# Patient Record
Sex: FEMALE | Race: WHITE | NOT HISPANIC OR LATINO
[De-identification: names, ages, dates, MRNs, and addresses within clinical notes are randomized per-mention and may not be internally consistent; named-entity substitution may affect disease eponyms.]

---

## 2020-08-06 ENCOUNTER — RESULT REVIEW (OUTPATIENT)
Age: 85
End: 2020-08-06

## 2020-09-09 ENCOUNTER — INPATIENT (INPATIENT)
Facility: HOSPITAL | Age: 85
LOS: 5 days | Discharge: HOME HEALTH | DRG: 919 | End: 2020-09-15
Admitting: HOSPITALIST
Payer: MEDICARE

## 2020-09-09 VITALS
DIASTOLIC BLOOD PRESSURE: 72 MMHG | OXYGEN SATURATION: 98 % | HEART RATE: 64 BPM | TEMPERATURE: 98 F | RESPIRATION RATE: 18 BRPM | HEIGHT: 64 IN | WEIGHT: 191.58 LBS | SYSTOLIC BLOOD PRESSURE: 130 MMHG

## 2020-09-09 DIAGNOSIS — K83.1 OBSTRUCTION OF BILE DUCT: ICD-10-CM

## 2020-09-09 DIAGNOSIS — Z90.710 ACQUIRED ABSENCE OF BOTH CERVIX AND UTERUS: Chronic | ICD-10-CM

## 2020-09-09 LAB
ABO RH CONFIRMATION: SIGNIFICANT CHANGE UP
ALBUMIN SERPL ELPH-MCNC: 2.6 G/DL — LOW (ref 3.3–5.2)
ALP SERPL-CCNC: 398 U/L — HIGH (ref 40–120)
ALT FLD-CCNC: 110 U/L — HIGH
ANION GAP SERPL CALC-SCNC: 12 MMOL/L — SIGNIFICANT CHANGE UP (ref 5–17)
APPEARANCE UR: CLEAR — SIGNIFICANT CHANGE UP
APTT BLD: 161.6 SEC — CRITICAL HIGH (ref 27.5–35.5)
APTT BLD: 77.9 SEC — HIGH (ref 27.5–35.5)
AST SERPL-CCNC: 114 U/L — HIGH
BACTERIA # UR AUTO: NEGATIVE — SIGNIFICANT CHANGE UP
BASOPHILS # BLD AUTO: 0.04 K/UL — SIGNIFICANT CHANGE UP (ref 0–0.2)
BASOPHILS NFR BLD AUTO: 0.8 % — SIGNIFICANT CHANGE UP (ref 0–2)
BILIRUB DIRECT SERPL-MCNC: >10 MG/DL — HIGH (ref 0–0.3)
BILIRUB INDIRECT FLD-MCNC: <4.6 MG/DL — HIGH (ref 0.2–1)
BILIRUB SERPL-MCNC: 14.6 MG/DL — HIGH (ref 0.4–2)
BILIRUB SERPL-MCNC: 14.6 MG/DL — HIGH (ref 0.4–2)
BILIRUB UR-MCNC: ABNORMAL
BUN SERPL-MCNC: 6 MG/DL — LOW (ref 8–20)
CALCIUM SERPL-MCNC: 8.6 MG/DL — SIGNIFICANT CHANGE UP (ref 8.6–10.2)
CHLORIDE SERPL-SCNC: 103 MMOL/L — SIGNIFICANT CHANGE UP (ref 98–107)
CO2 SERPL-SCNC: 22 MMOL/L — SIGNIFICANT CHANGE UP (ref 22–29)
COLOR SPEC: YELLOW — SIGNIFICANT CHANGE UP
CREAT SERPL-MCNC: <0.2 MG/DL — LOW (ref 0.5–1.3)
DIFF PNL FLD: ABNORMAL
EOSINOPHIL # BLD AUTO: 0.13 K/UL — SIGNIFICANT CHANGE UP (ref 0–0.5)
EOSINOPHIL NFR BLD AUTO: 2.6 % — SIGNIFICANT CHANGE UP (ref 0–6)
EPI CELLS # UR: SIGNIFICANT CHANGE UP
GLUCOSE BLDC GLUCOMTR-MCNC: 135 MG/DL — HIGH (ref 70–99)
GLUCOSE SERPL-MCNC: 128 MG/DL — HIGH (ref 70–99)
GLUCOSE UR QL: NEGATIVE MG/DL — SIGNIFICANT CHANGE UP
HCT VFR BLD CALC: 38.3 % — SIGNIFICANT CHANGE UP (ref 34.5–45)
HGB BLD-MCNC: 13.2 G/DL — SIGNIFICANT CHANGE UP (ref 11.5–15.5)
IMM GRANULOCYTES NFR BLD AUTO: 0.4 % — SIGNIFICANT CHANGE UP (ref 0–1.5)
INR BLD: 1.72 RATIO — HIGH (ref 0.88–1.16)
KETONES UR-MCNC: NEGATIVE — SIGNIFICANT CHANGE UP
LEUKOCYTE ESTERASE UR-ACNC: NEGATIVE — SIGNIFICANT CHANGE UP
LIDOCAIN IGE QN: 24 U/L — SIGNIFICANT CHANGE UP (ref 22–51)
LYMPHOCYTES # BLD AUTO: 1.07 K/UL — SIGNIFICANT CHANGE UP (ref 1–3.3)
LYMPHOCYTES # BLD AUTO: 21.1 % — SIGNIFICANT CHANGE UP (ref 13–44)
MCHC RBC-ENTMCNC: 32.8 PG — SIGNIFICANT CHANGE UP (ref 27–34)
MCHC RBC-ENTMCNC: 34.5 GM/DL — SIGNIFICANT CHANGE UP (ref 32–36)
MCV RBC AUTO: 95.3 FL — SIGNIFICANT CHANGE UP (ref 80–100)
MONOCYTES # BLD AUTO: 0.31 K/UL — SIGNIFICANT CHANGE UP (ref 0–0.9)
MONOCYTES NFR BLD AUTO: 6.1 % — SIGNIFICANT CHANGE UP (ref 2–14)
NEUTROPHILS # BLD AUTO: 3.49 K/UL — SIGNIFICANT CHANGE UP (ref 1.8–7.4)
NEUTROPHILS NFR BLD AUTO: 69 % — SIGNIFICANT CHANGE UP (ref 43–77)
NITRITE UR-MCNC: NEGATIVE — SIGNIFICANT CHANGE UP
PH UR: 6.5 — SIGNIFICANT CHANGE UP (ref 5–8)
PLATELET # BLD AUTO: 263 K/UL — SIGNIFICANT CHANGE UP (ref 150–400)
POTASSIUM SERPL-MCNC: 4.2 MMOL/L — SIGNIFICANT CHANGE UP (ref 3.5–5.3)
POTASSIUM SERPL-SCNC: 4.2 MMOL/L — SIGNIFICANT CHANGE UP (ref 3.5–5.3)
PROT SERPL-MCNC: 6.6 G/DL — SIGNIFICANT CHANGE UP (ref 6.6–8.7)
PROT UR-MCNC: 15 MG/DL
PROTHROM AB SERPL-ACNC: 19.4 SEC — HIGH (ref 10.6–13.6)
RBC # BLD: 4.02 M/UL — SIGNIFICANT CHANGE UP (ref 3.8–5.2)
RBC # FLD: 16.1 % — HIGH (ref 10.3–14.5)
RBC CASTS # UR COMP ASSIST: SIGNIFICANT CHANGE UP /HPF (ref 0–4)
SARS-COV-2 RNA SPEC QL NAA+PROBE: SIGNIFICANT CHANGE UP
SODIUM SERPL-SCNC: 137 MMOL/L — SIGNIFICANT CHANGE UP (ref 135–145)
SP GR SPEC: 1.01 — SIGNIFICANT CHANGE UP (ref 1.01–1.02)
UROBILINOGEN FLD QL: 1 MG/DL
WBC # BLD: 5.06 K/UL — SIGNIFICANT CHANGE UP (ref 3.8–10.5)
WBC # FLD AUTO: 5.06 K/UL — SIGNIFICANT CHANGE UP (ref 3.8–10.5)
WBC UR QL: SIGNIFICANT CHANGE UP

## 2020-09-09 PROCEDURE — 99222 1ST HOSP IP/OBS MODERATE 55: CPT

## 2020-09-09 PROCEDURE — 93010 ELECTROCARDIOGRAM REPORT: CPT

## 2020-09-09 PROCEDURE — 71045 X-RAY EXAM CHEST 1 VIEW: CPT | Mod: 26

## 2020-09-09 PROCEDURE — 99223 1ST HOSP IP/OBS HIGH 75: CPT | Mod: AI

## 2020-09-09 PROCEDURE — 99285 EMERGENCY DEPT VISIT HI MDM: CPT | Mod: CS

## 2020-09-09 PROCEDURE — 99223 1ST HOSP IP/OBS HIGH 75: CPT

## 2020-09-09 RX ORDER — PHYTONADIONE (VIT K1) 5 MG
10 TABLET ORAL ONCE
Refills: 0 | Status: DISCONTINUED | OUTPATIENT
Start: 2020-09-09 | End: 2020-09-09

## 2020-09-09 RX ORDER — INSULIN LISPRO 100/ML
VIAL (ML) SUBCUTANEOUS AT BEDTIME
Refills: 0 | Status: DISCONTINUED | OUTPATIENT
Start: 2020-09-09 | End: 2020-09-15

## 2020-09-09 RX ORDER — HEPARIN SODIUM 5000 [USP'U]/ML
INJECTION INTRAVENOUS; SUBCUTANEOUS
Qty: 25000 | Refills: 0 | Status: DISCONTINUED | OUTPATIENT
Start: 2020-09-09 | End: 2020-09-12

## 2020-09-09 RX ORDER — DEXTROSE 50 % IN WATER 50 %
12.5 SYRINGE (ML) INTRAVENOUS ONCE
Refills: 0 | Status: DISCONTINUED | OUTPATIENT
Start: 2020-09-09 | End: 2020-09-15

## 2020-09-09 RX ORDER — SODIUM CHLORIDE 9 MG/ML
1000 INJECTION INTRAMUSCULAR; INTRAVENOUS; SUBCUTANEOUS
Refills: 0 | Status: DISCONTINUED | OUTPATIENT
Start: 2020-09-09 | End: 2020-09-13

## 2020-09-09 RX ORDER — ATENOLOL 25 MG/1
50 TABLET ORAL
Refills: 0 | Status: DISCONTINUED | OUTPATIENT
Start: 2020-09-09 | End: 2020-09-15

## 2020-09-09 RX ORDER — INSULIN LISPRO 100/ML
VIAL (ML) SUBCUTANEOUS
Refills: 0 | Status: DISCONTINUED | OUTPATIENT
Start: 2020-09-09 | End: 2020-09-15

## 2020-09-09 RX ORDER — SPIRONOLACTONE 25 MG/1
50 TABLET, FILM COATED ORAL
Refills: 0 | Status: DISCONTINUED | OUTPATIENT
Start: 2020-09-09 | End: 2020-09-15

## 2020-09-09 RX ORDER — PIPERACILLIN AND TAZOBACTAM 4; .5 G/20ML; G/20ML
3.38 INJECTION, POWDER, LYOPHILIZED, FOR SOLUTION INTRAVENOUS EVERY 8 HOURS
Refills: 0 | Status: DISCONTINUED | OUTPATIENT
Start: 2020-09-09 | End: 2020-09-15

## 2020-09-09 RX ORDER — GLUCAGON INJECTION, SOLUTION 0.5 MG/.1ML
1 INJECTION, SOLUTION SUBCUTANEOUS ONCE
Refills: 0 | Status: DISCONTINUED | OUTPATIENT
Start: 2020-09-09 | End: 2020-09-15

## 2020-09-09 RX ORDER — HEPARIN SODIUM 5000 [USP'U]/ML
3000 INJECTION INTRAVENOUS; SUBCUTANEOUS EVERY 6 HOURS
Refills: 0 | Status: DISCONTINUED | OUTPATIENT
Start: 2020-09-09 | End: 2020-09-12

## 2020-09-09 RX ORDER — DEXTROSE 50 % IN WATER 50 %
25 SYRINGE (ML) INTRAVENOUS ONCE
Refills: 0 | Status: DISCONTINUED | OUTPATIENT
Start: 2020-09-09 | End: 2020-09-15

## 2020-09-09 RX ORDER — HEPARIN SODIUM 5000 [USP'U]/ML
6500 INJECTION INTRAVENOUS; SUBCUTANEOUS ONCE
Refills: 0 | Status: COMPLETED | OUTPATIENT
Start: 2020-09-09 | End: 2020-09-09

## 2020-09-09 RX ORDER — PHYTONADIONE (VIT K1) 5 MG
2.5 TABLET ORAL ONCE
Refills: 0 | Status: COMPLETED | OUTPATIENT
Start: 2020-09-09 | End: 2020-09-09

## 2020-09-09 RX ORDER — PIPERACILLIN AND TAZOBACTAM 4; .5 G/20ML; G/20ML
3.38 INJECTION, POWDER, LYOPHILIZED, FOR SOLUTION INTRAVENOUS ONCE
Refills: 0 | Status: COMPLETED | OUTPATIENT
Start: 2020-09-09 | End: 2020-09-09

## 2020-09-09 RX ORDER — LATANOPROST 0.05 MG/ML
1 SOLUTION/ DROPS OPHTHALMIC; TOPICAL AT BEDTIME
Refills: 0 | Status: DISCONTINUED | OUTPATIENT
Start: 2020-09-09 | End: 2020-09-15

## 2020-09-09 RX ORDER — SODIUM CHLORIDE 9 MG/ML
1000 INJECTION, SOLUTION INTRAVENOUS
Refills: 0 | Status: DISCONTINUED | OUTPATIENT
Start: 2020-09-09 | End: 2020-09-11

## 2020-09-09 RX ORDER — DEXTROSE 50 % IN WATER 50 %
15 SYRINGE (ML) INTRAVENOUS ONCE
Refills: 0 | Status: DISCONTINUED | OUTPATIENT
Start: 2020-09-09 | End: 2020-09-15

## 2020-09-09 RX ORDER — LANOLIN ALCOHOL/MO/W.PET/CERES
5 CREAM (GRAM) TOPICAL AT BEDTIME
Refills: 0 | Status: DISCONTINUED | OUTPATIENT
Start: 2020-09-09 | End: 2020-09-15

## 2020-09-09 RX ORDER — HEPARIN SODIUM 5000 [USP'U]/ML
6500 INJECTION INTRAVENOUS; SUBCUTANEOUS EVERY 6 HOURS
Refills: 0 | Status: DISCONTINUED | OUTPATIENT
Start: 2020-09-09 | End: 2020-09-12

## 2020-09-09 RX ADMIN — PIPERACILLIN AND TAZOBACTAM 200 GRAM(S): 4; .5 INJECTION, POWDER, LYOPHILIZED, FOR SOLUTION INTRAVENOUS at 18:18

## 2020-09-09 RX ADMIN — Medication 2.5 MILLIGRAM(S): at 18:18

## 2020-09-09 RX ADMIN — HEPARIN SODIUM 1200 UNIT(S)/HR: 5000 INJECTION INTRAVENOUS; SUBCUTANEOUS at 20:04

## 2020-09-09 RX ADMIN — Medication 5 MILLIGRAM(S): at 22:01

## 2020-09-09 RX ADMIN — SPIRONOLACTONE 50 MILLIGRAM(S): 25 TABLET, FILM COATED ORAL at 18:47

## 2020-09-09 RX ADMIN — Medication 20 MILLIGRAM(S): at 22:00

## 2020-09-09 RX ADMIN — LATANOPROST 1 DROP(S): 0.05 SOLUTION/ DROPS OPHTHALMIC; TOPICAL at 22:00

## 2020-09-09 RX ADMIN — ATENOLOL 50 MILLIGRAM(S): 25 TABLET ORAL at 18:47

## 2020-09-09 RX ADMIN — SODIUM CHLORIDE 50 MILLILITER(S): 9 INJECTION INTRAMUSCULAR; INTRAVENOUS; SUBCUTANEOUS at 15:19

## 2020-09-09 RX ADMIN — HEPARIN SODIUM 1500 UNIT(S)/HR: 5000 INJECTION INTRAVENOUS; SUBCUTANEOUS at 12:37

## 2020-09-09 RX ADMIN — HEPARIN SODIUM 0 UNIT(S)/HR: 5000 INJECTION INTRAVENOUS; SUBCUTANEOUS at 19:07

## 2020-09-09 NOTE — ED ADULT NURSE REASSESSMENT NOTE - NS ED NURSE REASSESS COMMENT FT1
GI MD ordered vit K due to elevated INR. RN spoke with MD and nurse manager regarding giving due to pt on heparin gtt, Emergency Department pharmacist aware, she will speak to MD and clarify order for vit k.

## 2020-09-09 NOTE — CONSULT NOTE ADULT - SUBJECTIVE AND OBJECTIVE BOX
Patient is a 89y old  Female who presents with a chief complaint of jaundice with malaise, weakness and light headedness.    HPI: 89 year old female admitted to [place] on about 8/5 with jaundice. W/U with cat scan and MRI showed a dilated pancreatic duct and some minor CBD dilation with possible stricture in proximal pancreatic duct near the ampulla. On 8/6 she underwent an ERCP by Dr. Roth revealing a ? stricture of the CBD just proximal to the ampulla and a tortuous pancreatic duct. Brushings were performed and a 5cm X 8.5 F CBD stent was placed. Imaging did not show any pancreatic lesions although there were some small pancreatic stents present. She was discharged to rehab and was lost to F/U. She was readmitted to [place] 9/5 after staff at rehab noted jaundice. Patient continues with malaise, light headedness and weakness but no abdominal pain, nausea or vomiting. She tolerates PO intake. Patient now transferred here for EUS/ERCP. She denies any chest pain or SOB. There is no constipation although her BMs are less frequent as she has been eating less since being admitted to [facility].      REVIEW OF SYSTEMS:    CONSTITUTIONAL: no weight loss but otherwise as above  EYES: No eye pain, visual disturbances, or discharge  ENMT:  No difficulty hearing, tinnitus, vertigo; No sinus or throat pain  NECK: No pain or stiffness  RESPIRATORY: No cough, wheezing, chills or hemoptysis; No shortness of breath  CARDIOVASCULAR: No chest pain, palpitations, dizziness, or leg swelling  GASTROINTESTINAL: as above  NEUROLOGICAL: No headaches, memory loss, loss of strength, numbness, or tremors  SKIN: No itching, burning, rashes, or lesions   LYMPH NODES: No enlarged glands  MUSCULOSKELETAL: No joint pain or swelling; No muscle, back, or extremity pain  PSYCHIATRIC: No depression, anxiety, mood swings, or difficulty sleeping  HEME/LYMPH: No easy bruising, or bleeding gums  ALLERY AND IMMUNOLOGIC: No hives or eczema      PAST MEDICAL & SURGICAL HISTORY:  Hypertension  Afib      FAMILY HISTORY:      SOCIAL HISTORY:  Smoking Status: [ ] Current, [ ] Former, [x ] Never  Pack Years:  Alcohol Use: none    Home Medications:      MEDICATIONS:  MEDICATIONS  (STANDING):  heparin  Infusion.  Unit(s)/Hr (15 mL/Hr) IV Continuous <Continuous>    MEDICATIONS  (PRN):  heparin   Injectable 6500 Unit(s) IV Push every 6 hours PRN For aPTT less than 40  heparin   Injectable 3000 Unit(s) IV Push every 6 hours PRN For aPTT between 40 - 57      Allergies    No Known Allergies    Intolerances        Vital Signs Last 24 Hrs  T(C): 36.6 (09 Sep 2020 11:22), Max: 36.6 (09 Sep 2020 11:22)  T(F): 97.8 (09 Sep 2020 11:22), Max: 97.8 (09 Sep 2020 11:22)  HR: 72 (09 Sep 2020 12:01) (64 - 72)  BP: 166/86 (09 Sep 2020 12:01) (130/72 - 166/86)  BP(mean): --  RR: 18 (09 Sep 2020 12:01) (18 - 18)  SpO2: 98% (09 Sep 2020 12:01) (98% - 98%)        PHYSICAL EXAM:    General: Well developed; well nourished; in no acute distress, jaundiced  HEENT: MMM, conjunctiva and scleral icterus  Lungs: Clear  Heart: Rhythm irregularly irregular, No Murmurs  Gastrointestinal: Soft, non-tender non-distended; Normal bowel sounds; No rebound or guarding; No Organomegaly & No Masses, liver felt 2FB below right costal margin  Extremities: Normal range of motion, 1+ pitting edema with ecchymosis over distal legs and ankles  Neurological: Alert and oriented x3, Non-focal  Skin: Warm and dry. No obvious rash        LABS:                        13.2   5.06  )-----------( 263      ( 09 Sep 2020 12:34 )             38.3     09-09    137  |  103  |  6.0<L>  ----------------------------<  128<H>  4.2   |  22.0  |  <0.20<L>    Ca    8.6      09 Sep 2020 13:47    TPro  6.6  /  Alb  2.6<L>  /  TBili  14.6<H>  /  DBili  >10.0<H>  /  AST  114<H>  /  ALT  110<H>  /  AlkPhos  398<H>  09-09 Patient is a 89y old  Female who presents with a chief complaint of jaundice with malaise, weakness and light headedness.    HPI: 89 year old female admitted to [place] on about 8/5 with jaundice. W/U with cat scan and MRI showed a dilated pancreatic duct and some minor CBD dilation with possible stricture in proximal pancreatic duct near the ampulla. On 8/6 she underwent an ERCP by Dr. Roth revealing a ? stricture of the CBD just proximal to the ampulla and a tortuous pancreatic duct. Brushings were performed and a 5cm X 8.5 F CBD stent was placed. Imaging did not show any pancreatic lesions although there were some small pancreatic cysts present. Cytology showed atypical cells. She also experienced post ERCP pancreatitis which resolved. She was discharged to rehab and was lost to F/U. She was readmitted to [place] 9/5 after staff at rehab noted jaundice. Patient continues with malaise, light headiness and weakness but no abdominal pain, nausea or vomiting. She tolerates PO intake. Patient now transferred here for EUS/ERCP. She denies any chest pain or SOB. There is no constipation although her BMs are less frequent as she has been eating less since being admitted to [facility].      REVIEW OF SYSTEMS:    CONSTITUTIONAL: no weight loss but otherwise as above  EYES: No eye pain, visual disturbances, or discharge  ENMT:  No difficulty hearing, tinnitus, vertigo; No sinus or throat pain  NECK: No pain or stiffness  RESPIRATORY: No cough, wheezing, chills or hemoptysis; No shortness of breath  CARDIOVASCULAR: No chest pain, palpitations, dizziness, or leg swelling  GASTROINTESTINAL: as above  NEUROLOGICAL: No headaches, memory loss, loss of strength, numbness, or tremors  SKIN: No itching, burning, rashes, or lesions   LYMPH NODES: No enlarged glands  MUSCULOSKELETAL: No joint pain or swelling; No muscle, back, or extremity pain  PSYCHIATRIC: No depression, anxiety, mood swings, or difficulty sleeping  HEME/LYMPH: No easy bruising, or bleeding gums  ALLERY AND IMMUNOLOGIC: No hives or eczema      PAST MEDICAL & SURGICAL HISTORY:  Hypertension  Afib  new onset of DM at time of [facility] hospital stay  CHF with preserved EF      FAMILY HISTORY:      SOCIAL HISTORY:  Smoking Status: [ ] Current, [ ] Former, [x ] Never  Pack Years:  Alcohol Use: none    Home Medications:      MEDICATIONS:  MEDICATIONS  (STANDING):  heparin  Infusion.  Unit(s)/Hr (15 mL/Hr) IV Continuous <Continuous>    MEDICATIONS  (PRN):  heparin   Injectable 6500 Unit(s) IV Push every 6 hours PRN For aPTT less than 40  heparin   Injectable 3000 Unit(s) IV Push every 6 hours PRN For aPTT between 40 - 57      Allergies    No Known Allergies    Intolerances        Vital Signs Last 24 Hrs  T(C): 36.6 (09 Sep 2020 11:22), Max: 36.6 (09 Sep 2020 11:22)  T(F): 97.8 (09 Sep 2020 11:22), Max: 97.8 (09 Sep 2020 11:22)  HR: 72 (09 Sep 2020 12:01) (64 - 72)  BP: 166/86 (09 Sep 2020 12:01) (130/72 - 166/86)  BP(mean): --  RR: 18 (09 Sep 2020 12:01) (18 - 18)  SpO2: 98% (09 Sep 2020 12:01) (98% - 98%)        PHYSICAL EXAM:    General: Well developed; well nourished; in no acute distress, jaundiced  HEENT: MMM, conjunctiva and scleral icterus  Lungs: Clear  Heart: Rhythm irregularly irregular, No Murmurs  Gastrointestinal: Soft, non-tender non-distended; Normal bowel sounds; No rebound or guarding; No Organomegaly & No Masses, liver felt 2FB below right costal margin  Extremities: Normal range of motion, 1+ pitting edema with ecchymosis over distal legs and ankles  Neurological: Alert and oriented x3, Non-focal  Skin: Warm and dry. No obvious rash        LABS:                        13.2   5.06  )-----------( 263      ( 09 Sep 2020 12:34 )             38.3     09-09    137  |  103  |  6.0<L>  ----------------------------<  128<H>  4.2   |  22.0  |  <0.20<L>    Ca    8.6      09 Sep 2020 13:47    TPro  6.6  /  Alb  2.6<L>  /  TBili  14.6<H>  /  DBili  >10.0<H>  /  AST  114<H>  /  ALT  110<H>  /  AlkPhos  398<H>  09-09 Patient is a 89y old  Female who presents with a chief complaint of jaundice with malaise, weakness and light headedness.    HPI: 89 year old female admitted to [place] on about 8/5 with jaundice. W/U with cat scan and MRI showed a dilated pancreatic duct and some minor CBD dilation with possible stricture in proximal pancreatic duct near the ampulla. On 8/6 she underwent an ERCP by Dr. Roth revealing a ? stricture of the CBD just proximal to the ampulla and a tortuous pancreatic duct. Brushings were performed and a 5cm X 8.5 F CBD stent was placed. Imaging did not show any pancreatic lesions although there were some small pancreatic cysts present. Cytology showed atypical cells. She also experienced post ERCP pancreatitis which resolved. She was discharged to rehab and was lost to F/U. She was readmitted to [place] 9/5 after staff at rehab noted jaundice. Patient continues with malaise, light headiness and weakness but no abdominal pain, nausea or vomiting. She tolerates PO intake. Patient now transferred here for EUS/ERCP. She denies any chest pain or SOB. There is no constipation although her BMs are less frequent as she has been eating less since being admitted to [facility]. MRI/MRCP two days ago showed LACK OF GADOLINIUM DECREASES SENSITIVITY FOR DETECTION OF INTRA-ABDOMINAL  PATHOLOGY.    LIVER: No hepatic signal abnormality.  No focal hepatic lesions. There is  periportal edema.    BILIARY and pancreas: Hydropic gallbladder containing multiple  gallstones. A stent is seen within the common bile duct. The common bile  duct is dilated up to 1.8 cm. There is also diffuse dilatation of the  pancreatic duct. Both ducts appear to abruptly cut off at the level of  the ampulla suspicious for an ampullary lesion or lesion at the head of  the pancreas. The pancreatic duct is dilated up to 1.0 cm. A  circumscribed T2 hyperintense cystic lesion is seen in the tail of  pancreas which measures 1.1 cm.    SPLEEN: No splenomegaly.          REVIEW OF SYSTEMS:    CONSTITUTIONAL: no weight loss but otherwise as above  EYES: No eye pain, visual disturbances, or discharge  ENMT:  No difficulty hearing, tinnitus, vertigo; No sinus or throat pain  NECK: No pain or stiffness  RESPIRATORY: No cough, wheezing, chills or hemoptysis; No shortness of breath  CARDIOVASCULAR: No chest pain, palpitations, dizziness, or leg swelling  GASTROINTESTINAL: as above  NEUROLOGICAL: No headaches, memory loss, loss of strength, numbness, or tremors  SKIN: No itching, burning, rashes, or lesions   LYMPH NODES: No enlarged glands  MUSCULOSKELETAL: No joint pain or swelling; No muscle, back, or extremity pain  PSYCHIATRIC: No depression, anxiety, mood swings, or difficulty sleeping  HEME/LYMPH: No easy bruising, or bleeding gums  ALLERY AND IMMUNOLOGIC: No hives or eczema      PAST MEDICAL & SURGICAL HISTORY:  Hypertension  Afib  new onset of DM at time of [facility] hospital stay  CHF with preserved EF      FAMILY HISTORY:      SOCIAL HISTORY:  Smoking Status: [ ] Current, [ ] Former, [x ] Never  Pack Years:  Alcohol Use: none    Home Medications:      MEDICATIONS:  MEDICATIONS  (STANDING):  heparin  Infusion.  Unit(s)/Hr (15 mL/Hr) IV Continuous <Continuous>    MEDICATIONS  (PRN):  heparin   Injectable 6500 Unit(s) IV Push every 6 hours PRN For aPTT less than 40  heparin   Injectable 3000 Unit(s) IV Push every 6 hours PRN For aPTT between 40 - 57      Allergies    No Known Allergies    Intolerances        Vital Signs Last 24 Hrs  T(C): 36.6 (09 Sep 2020 11:22), Max: 36.6 (09 Sep 2020 11:22)  T(F): 97.8 (09 Sep 2020 11:22), Max: 97.8 (09 Sep 2020 11:22)  HR: 72 (09 Sep 2020 12:01) (64 - 72)  BP: 166/86 (09 Sep 2020 12:01) (130/72 - 166/86)  BP(mean): --  RR: 18 (09 Sep 2020 12:01) (18 - 18)  SpO2: 98% (09 Sep 2020 12:01) (98% - 98%)        PHYSICAL EXAM:    General: Well developed; well nourished; in no acute distress, jaundiced  HEENT: MMM, conjunctiva and scleral icterus  Lungs: Clear  Heart: Rhythm irregularly irregular, No Murmurs  Gastrointestinal: Soft, non-tender non-distended; Normal bowel sounds; No rebound or guarding; No Organomegaly & No Masses, liver felt 2FB below right costal margin  Extremities: Normal range of motion, 1+ pitting edema with ecchymosis over distal legs and ankles  Neurological: Alert and oriented x3, Non-focal  Skin: Warm and dry. No obvious rash        LABS:                        13.2   5.06  )-----------( 263      ( 09 Sep 2020 12:34 )             38.3     09-09    137  |  103  |  6.0<L>  ----------------------------<  128<H>  4.2   |  22.0  |  <0.20<L>    Ca    8.6      09 Sep 2020 13:47    TPro  6.6  /  Alb  2.6<L>  /  TBili  14.6<H>  /  DBili  >10.0<H>  /  AST  114<H>  /  ALT  110<H>  /  AlkPhos  398<H>  09-09 Patient is a 89y old  Female who presents with a chief complaint of jaundice with malaise, weakness and light headedness.    HPI: 89 year old female admitted to [place] [facility] on about 8/5 with jaundice as well as abdominal pain and a 30 pound weight loss over past 3-4 months.  W/U with cat scan and MRI showed a dilated pancreatic duct and some minor CBD dilation with possible stricture in proximal pancreatic duct near the ampulla. On 8/6 she underwent an ERCP by Dr. Roth revealing a ? stricture of the CBD just proximal to the ampulla and a tortuous pancreatic duct. Brushings were performed and a 5cm X 8.5 F CBD stent was placed. Imaging did not show any pancreatic lesions although there were some small pancreatic cysts present. Cytology showed atypical cells. She also experienced post ERCP pancreatitis which resolved. She was discharged to rehab and was lost to F/U. She was readmitted to [facility] 9/5 after staff at rehab noted jaundice. Patient continues with malaise, light headiness and weakness but no abdominal pain, nausea or vomiting. She tolerates PO intake. Patient now transferred here for EUS/ERCP. She denies any chest pain or SOB. There is no constipation although her BMs are less frequent as she has been eating less since being admitted to [facility]. At [facility] she was treated with 4 days of Zosyn and a heparin drip. Also noted to have an E colit UTI. MRI/MRCP two days ago showed LACK OF GADOLINIUM DECREASES SENSITIVITY FOR DETECTION OF INTRA-ABDOMINAL  PATHOLOGY.    LIVER: No hepatic signal abnormality.  No focal hepatic lesions. There is  periportal edema.    BILIARY and pancreas: Hydropic gallbladder containing multiple  gallstones. A stent is seen within the common bile duct. The common bile  duct is dilated up to 1.8 cm. There is also diffuse dilatation of the  pancreatic duct. Both ducts appear to abruptly cut off at the level of  the ampulla suspicious for an ampullary lesion or lesion at the head of  the pancreas. The pancreatic duct is dilated up to 1.0 cm. A  circumscribed T2 hyperintense cystic lesion is seen in the tail of  pancreas which measures 1.1 cm.    SPLEEN: No splenomegaly.          REVIEW OF SYSTEMS:    CONSTITUTIONAL: no weight loss but otherwise as above  EYES: No eye pain, visual disturbances, or discharge  ENMT:  No difficulty hearing, tinnitus, vertigo; No sinus or throat pain  NECK: No pain or stiffness  RESPIRATORY: No cough, wheezing, chills or hemoptysis; No shortness of breath  CARDIOVASCULAR: No chest pain, palpitations, dizziness, or leg swelling  GASTROINTESTINAL: as above  NEUROLOGICAL: No headaches, memory loss, loss of strength, numbness, or tremors  SKIN: No itching, burning, rashes, or lesions   LYMPH NODES: No enlarged glands  MUSCULOSKELETAL: No joint pain or swelling; No muscle, back, or extremity pain  PSYCHIATRIC: No depression, anxiety, mood swings, or difficulty sleeping  HEME/LYMPH: No easy bruising, or bleeding gums  ALLERY AND IMMUNOLOGIC: No hives or eczema      PAST MEDICAL & SURGICAL HISTORY:  Hypertension  Afib  new onset of DM at time of [place] hospital stay  CHF with preserved EF      FAMILY HISTORY:      SOCIAL HISTORY:  Smoking Status: [ ] Current, [ ] Former, [x ] Never  Pack Years:  Alcohol Use: none     Medications on transfer:  heparin  aldactone 50mg BID  zofran  atenalol  albuterol  dicyclomine  bisacodyl  melatonin  fluticasone    MEDICATIONS:  MEDICATIONS  (STANDING):  heparin  Infusion.  Unit(s)/Hr (15 mL/Hr) IV Continuous <Continuous>    MEDICATIONS  (PRN):  heparin   Injectable 6500 Unit(s) IV Push every 6 hours PRN For aPTT less than 40  heparin   Injectable 3000 Unit(s) IV Push every 6 hours PRN For aPTT between 40 - 57      Allergies  Pen  sulfa  clinda          Vital Signs Last 24 Hrs  T(C): 36.6 (09 Sep 2020 11:22), Max: 36.6 (09 Sep 2020 11:22)  T(F): 97.8 (09 Sep 2020 11:22), Max: 97.8 (09 Sep 2020 11:22)  HR: 72 (09 Sep 2020 12:01) (64 - 72)  BP: 166/86 (09 Sep 2020 12:01) (130/72 - 166/86)  BP(mean): --  RR: 18 (09 Sep 2020 12:01) (18 - 18)  SpO2: 98% (09 Sep 2020 12:01) (98% - 98%)        PHYSICAL EXAM:    General: Well developed; well nourished; in no acute distress, jaundiced  HEENT: MMM, conjunctiva and scleral icterus  Lungs: Clear  Heart: Rhythm irregularly irregular, No Murmurs  Gastrointestinal: Soft, non-tender non-distended; Normal bowel sounds; No rebound or guarding; No Organomegaly & No Masses, liver felt 2FB below right costal margin  Extremities: Normal range of motion, 1+ pitting edema with ecchymosis over distal legs and ankles  Neurological: Alert and oriented x3, Non-focal  Skin: Warm and dry. No obvious rash        LABS:                        13.2   5.06  )-----------( 263      ( 09 Sep 2020 12:34 )             38.3     09-09    137  |  103  |  6.0<L>  ----------------------------<  128<H>  4.2   |  22.0  |  <0.20<L>    Ca    8.6      09 Sep 2020 13:47    TPro  6.6  /  Alb  2.6<L>  /  TBili  14.6<H>  /  DBili  >10.0<H>  /  AST  114<H>  /  ALT  110<H>  /  AlkPhos  398<H>  09-09

## 2020-09-09 NOTE — ED PROVIDER NOTE - OBJECTIVE STATEMENT
88 yo F hx of Afib on xarelto (last dose 9/4, now on heparin infusion), HTN, CHF, chronic pancreatitis, biliary stent placed 8/20, presented to [facility] from rehab for generalized weakness. She was found jaundice.  US and MRCP showed dilated CBD and pancreatic duct, stent within common bile duct, hydropic gallbladder with multiple gallstone. Patient was transferred to [place] for ERCP by . Patient is DNR/DNI but agreeable to precedure. patient s.p 4 days of zosyn.

## 2020-09-09 NOTE — ED ADULT NURSE NOTE - CHIEF COMPLAINT QUOTE
Pt transferred from [facility] for worsening abdominal pain s/p biliary stent in August, hx of renal lesion, pt jaundice, was given zosyn and fluids PTA, sent for IR renal biopsy, on heparin drip @ 850 units/hour

## 2020-09-09 NOTE — ED PROVIDER NOTE - PHYSICAL EXAMINATION
VITAL SIGNS: I have reviewed nursing notes and confirm.  CONSTITUTIONAL: Well-developed; well-nourished; in no acute distress.  SKIN: Skin exam is warm and dry, no acute rash. (+) jaundice  HEAD: Normocephalic; atraumatic.  EYES: PERRL, EOM intact; conjunctiva and sclera clear.  ENT: No nasal discharge; airway clear. Throat clear.  NECK: Supple; non tender.    CARD: S1, S2 normal; Regular rate and rhythm.  RESP: No wheezes,  no rales or rhonchi.   ABD:  soft; non-distended; non-tender;   EXT: Normal ROM. No clubbing, cyanosis or edema.  NEURO: Alert, oriented. Grossly unremarkable. No focal deficits. no facial droop, moves all extremities,  normal gait   PSYCH: Cooperative, appropriate.

## 2020-09-09 NOTE — CONSULT NOTE ADULT - PROBLEM SELECTOR RECOMMENDATION 9
probable clogged CBD stent. Needs replacement of stent and UES to rule out small pancreatic neoplasm or cholangioca. Will plan for procedures either late tomorrow or more likely on Friday due to availability of Dr. Mcdaniel. Will feed patient and place NPO after midnight. Needs cardiac clearance for the procedures. No anticogulation other than heparin. probable clogged CBD stent. Needs replacement of stent and UES to rule out small pancreatic neoplasm or cholangioca. Will plan for procedures either late tomorrow or more likely on Friday due to availability of Dr. Mcdaniel. Will feed patient and place NPO after midnight. Needs cardiac clearance for the procedures. No anticoagulation other than heparin. Will give dose of IV vitamin K.

## 2020-09-09 NOTE — ED ADULT NURSE REASSESSMENT NOTE - NS ED NURSE REASSESS COMMENT FT1
Pt has no complaints at this time. VSS. Resp even and unlabored. No acute distress present at this time. Pt resting comfortably in stretcher w/call bell in reach. Awaiting clean bed.

## 2020-09-09 NOTE — H&P ADULT - NSHPSOCIALHISTORY_GEN_ALL_CORE
never smoker, denies alcohol or other drug use  lives alone, uses rolator in home, RW outside of home

## 2020-09-09 NOTE — H&P ADULT - NSHPPHYSICALEXAM_GEN_ALL_CORE
CONSTITUTIONAL:  NAD  EYES: +EOMI, scleral icterus   CARDIAC: irregularly irregular; +S1, S2.    RESPIRATORY: Clear to auscultation bilaterally, no wheezes noted  GASTROINTESTINAL: Abdomen soft, non-tender, no guarding. +normoactive bowel sounds; no guarding or rebound  NEUROLOGICAL: Alert and oriented x3, non-focal  SKIN: warm, dry, +jaundice

## 2020-09-09 NOTE — H&P ADULT - NSHPLABSRESULTS_GEN_ALL_CORE
13.2   5.06  )-----------( 263      ( 09 Sep 2020 12:34 )             38.3   09-09    137  |  103  |  6.0<L>  ----------------------------<  128<H>  4.2   |  22.0  |  <0.20<L>    Ca    8.6      09 Sep 2020 13:47    TPro  6.6  /  Alb  2.6<L>  /  TBili  14.6<H>  /  DBili  >10.0<H>  /  AST  114<H>  /  ALT  110<H>  /  AlkPhos  398<H>  09-09    < from: Xray Chest 1 View- PORTABLE-Urgent (09.09.20 @ 15:47) >    INTERPRETATION:  TECHNIQUE: Single portable view of the chest.    COMPARISON: None.    CLINICAL HISTORY: pre-op    FINDINGS:    Single frontal view ofthe chest demonstrates the lungs to be clear. The cardiomediastinal silhouette is enlarged. No acute osseous abnormalities. Overlying EKG leads and wires are noted.    IMPRESSION: No acute cardiopulmonary disease process. Cardiomegaly.    < end of copied text >

## 2020-09-09 NOTE — H&P ADULT - ASSESSMENT
89y/oF PMH chronic afib on xarelto, HFpEF, HTN, glaucoma transferred from [facility] for EUS, FNA, ERCP.  Pt recently admitted to [place] (8/5-8/14) with jaundice, c/o weakness during which time, pt had CT and MRI with dilated pancreatic duct, CBD dilatation, poss stricture in proximal pancreatic duct near ampulla. 8/6 had ERCP by Dr. Roth with ? stricture of CBD just proximal to ampulla and tortuous pancreatic duct. 5cm x 8.5F CBD stent placed. Imaging showed small pancreatic cysts but no lesions. Cytology: +atypical cells. Course complicated by pancreatitis post-ERCP, which resolved. Pt was then d/c'ed to rehab. Readmitted to [place] 9/5 with worsening jaundice.     Biliary obstruction  painless jaundice  elevated LFTs  -s/p recent CBD stent (8/6/20)  -abd sono: hyropic gallbladder with cholecystolithias, worsening dilatation of CBD with intrahepatic biliary ductal dilatation and stable dilatation of pancreatic duct   -MRCP 9/7: dilatation of CBD and pancreatic duct, which abruptly taper at level of ampulla; suspicious for ampullary lesion or lesion at head of pancreas. (main duct IPMN also in differential). hydropic gallbladder, no pericholecystic edema.   -plan for EUS, ERCP, r/o small pancreatic neoplasm or cholangiocarcinoma 9/10  -NPO after midnight tonight 9/9  -vit K PO as per Dr. Ortiz and Dr. Mcdaniel   -cardio recs appreciated     chronic afib, HFpEF chronic diastolic heart failure, HTN  -holding home xarelto for procedure  -cont heparin gtt  -cont atenolol 50mg BID with holding parameters     E.coli UTI  -positive urine cx at [facility]   -s/p 4 days Zosyn, will cont zosyn here    Diabetes type 2  -HgbA1c 6.4  -not on home meds  -cont ISS    Glaucoma   -cont travoprost gtt

## 2020-09-09 NOTE — CONSULT NOTE ADULT - SUBJECTIVE AND OBJECTIVE BOX
[facility]                                                               Office:  [address] [address]                                                              Telephone: [phone]. Fax:[phone]                                                                        CARDIOLOGY CONSULTATION NOTE                                                                                             Consult requested by:  Dr. Evans  Reason for Consultation: Cardiac Risk Stratification  History obtained by: Patient and medical record   obtained: No    Chief complaint:    Patient is a 89y old  Female who presents with a chief complaint of Obstructive jaundice (09 Sep 2020 16:14)        HPI:  89yo Female w/PMH chronic Afib on xarelto (last dose ), HFpEF (TTE 2018: EF 55-60%), HTN, glaucoma. Per record "admitted to [facility] on about  with jaundice as well as abdominal pain and a 30 pound weight loss over past 3-4 months.  W/U with cat scan and MRI showed a dilated pancreatic duct and some minor CBD dilation with possible stricture in proximal pancreatic duct near the ampulla. On  she underwent an ERCP by Dr. Roth revealing a ? stricture of the CBD just proximal to the ampulla and a tortuous pancreatic duct. Brushings were performed and a 5cm X 8.5 F CBD stent was placed. Imaging did not show any pancreatic lesions although there were some small pancreatic cysts present. Cytology showed atypical cells. She also experienced post ERCP pancreatitis which resolved. She was discharged to rehab and was lost to F/U. She was readmitted to [facility]  after staff at rehab noted jaundice. Patient continues with malaise, light headiness and weakness but no abdominal pain, nausea or vomiting. She tolerates PO intake. Patient now transferred here for EUS/ERCP."  Patient reports no Hx of anginal symptoms, and having good functional capacity at baseline.  Records obtained from cardiologist Dr. Glen Contreras ([phone]). Denies chest pain/pressure, palpitations, rapid heart beat, SOB, PALACIO, syncope/near syncope, dizziness, orthopnea, PND, cough, f/c, n/v/d, hematuria, or hematochezia.         REVIEW OF SYMPTOMS:     CONSTITUTIONAL: No fever, weight loss, or fatigue  ENMT:  No difficulty hearing, tinnitus, vertigo; No sinus or throat pain  NECK: No pain or stiffness  CARDIOVASCULAR: as per HPI  RESPIRATORY: No Dyspnea on exertion, Shortness of breath, cough, wheezing  : No dysuria, no hematuria   GI: No dark color stool, no melena, no diarrhea, no constipation, no abdominal pain   NEURO: No headache, no dizziness, no slurred speech   MUSCULOSKELETAL: No joint pain or swelling; No muscle, back, or extremity pain  PSYCH: No agitation, no anxiety.    ALL OTHER REVIEW OF SYSTEMS ARE NEGATIVE.      PREVIOUS DIAGNOSTIC TESTING  ECHO FINDINGS:  TTE 2018: EF 55-60%. No RWMA or significant valvulopathy. Documents in alpha.          ALLERGIES: Allergies    clindamycin (Rash)  penicillin (Rash)  sulfa drugs (Unknown)    Intolerances          PAST MEDICAL HISTORY  Glaucoma  Hypertension  Afib      PAST SURGICAL HISTORY  S/P hysterectomy      FAMILY HISTORY:  FH: CAD (coronary artery disease)      SOCIAL HISTORY:  Denies smoking/alcohol/drugs  CIGARETTES:     ALCOHOL:  DRUGS:      CURRENT MEDICATIONS:     heparin  Infusion.  phytonadione   Solution  sodium chloride 0.9%.        HOME MEDICATIONS:  Atenolol 50mg PO BID  Spironolactone 50mg PO QD  Xarelto 20mg PO QD  Furosemide 20mg PO QD PRN for edema  KCl PRN when takes Furosemide    Vital Signs Last 24 Hrs  T(C): 36.5 (09 Sep 2020 16:31), Max: 36.6 (09 Sep 2020 11:22)  T(F): 97.7 (09 Sep 2020 16:31), Max: 97.9 (09 Sep 2020 15:47)  HR: 71 (09 Sep 2020 16:31) (64 - 80)  BP: 118/80 (09 Sep 2020 16:31) (118/80 - 166/86)  BP(mean): --  RR: 18 (09 Sep 2020 16:31) (18 - 18)  SpO2: 97% (09 Sep 2020 16:31) (97% - 99%)      PHYSICAL EXAM:  Constitutional: Comfortable. No acute distress.   HEENT: Atraumatic and normocephalic, neck is supple. No JVD. No carotid bruit. PEERL   CNS: A&Ox3. No focal deficits. EOMI. Cranial nerves II-IX are intact.   Lymph Nodes: Cervical: Not palpable.  Respiratory: CTAB  Cardiovascular: S1S2 RRR. No murmur/rubs or gallop.  Gastrointestinal: Soft non-tender and non distended. +Bowel sounds. Negative Bowie's sign.  Extremities: trace b/l LE edema.   Psychiatric: Calm. No agitation.  Skin: No skin rash/ulcers visualized to face, hands or feet.    Intake and output:     LABS:                        13.2   5.06  )-----------( 263      ( 09 Sep 2020 12:34 )             38.3         137  |  103  |  6.0<L>  ----------------------------<  128<H>  4.2   |  22.0  |  <0.20<L>    Ca    8.6      09 Sep 2020 13:47    TPro  6.6  /  Alb  2.6<L>  /  TBili  14.6<H>  /  DBili  >10.0<H>  /  AST  114<H>  /  ALT  110<H>  /  AlkPhos  398<H>        ;p-BNP=  PT/INR - ( 09 Sep 2020 13:10 )   PT: 19.4 sec;   INR: 1.72 ratio         PTT - ( 09 Sep 2020 13:10 )  PTT:77.9 sec  Urinalysis Basic - ( 09 Sep 2020 12:33 )    Color: Yellow / Appearance: Clear / S.010 / pH: x  Gluc: x / Ketone: Negative  / Bili: Moderate / Urobili: 1 mg/dL   Blood: x / Protein: 15 mg/dL / Nitrite: Negative   Leuk Esterase: Negative / RBC: 0-2 /HPF / WBC 0-2   Sq Epi: x / Non Sq Epi: Occasional / Bacteria: Negative        INTERPRETATION OF TELEMETRY: no CM  ECG: AFib with controlled RVR, poor R wave progression. unchanged from 2019.    RADIOLOGY & ADDITIONAL STUDIES:    X-ray:   < from: Xray Chest 1 View- PORTABLE-Urgent (20 @ 15:47) >  IMPRESSION: No acute cardiopulmonary disease process. Cardiomegaly.    < end of copied text >    CT scan:   MRI:

## 2020-09-09 NOTE — ED PROVIDER NOTE - NS ED ROS FT
Review of Systems  •	CONSTITUTIONAL - no  fever, no diaphoresis, no weight change  •	SKIN - no rash (+) jaundice   •	HEMATOLOGIC - no bleeding, no bruising  •	EYES - no eye pain, no blurred vision  •	ENT - no change in hearing, no pain  •	RESPIRATORY - no shortness of breath, no cough  •	CARDIAC - no chest pain, no palpitations  •	GI - no abd pain, no nausea, no vomiting, no diarrhea, no constipation, no bleeding  •	GENITO-URINARY - no discharge, no dysuria; no hematuria,   •	ENDO - no polydipsia, no polyuria, no heat/no cold intolerance  •	MUSCULOSKELETAL - no joint pain, no swelling, no redness  •	NEUROLOGIC - no weakness, no headache, no anesthesia, no paresthesias  •	PSYCH - no anxiety, non suicidal, non homicidal, no hallucination, no depression

## 2020-09-09 NOTE — H&P ADULT - HISTORY OF PRESENT ILLNESS
89y/oF PMH chronic afib on xarelto, HFpEF, HTN, glaucoma 89y/oF PMH chronic afib on xarelto, HFpEF, HTN, glaucoma transferred from [facility] [place] for EUS, FNA, ERCP.  Pt recently admitted to [facility] (8/5-8/14) with jaundice, c/o weakness during which time, pt had CT and MRI with dilated pancreatic duct, CBD dilatation, poss stricture in proximal pancreatic duct near ampulla. 8/6 had ERCP by Dr. Roth with ? stricture of CBD just proximal to ampulla and tortuous pancreatic duct. 5cm x 8.5F CBD stent placed. Imaging showed small pancreatic cysts but no lesions. Cytology: +atypical cells. Course complicated by pancreatitis post-ERCP, which resolved. Pt was then d/c'ed to rehab. Readmitted to [facility] 9/5 with worsening jaundice.     Pt reports generalized feeling of weakness, sometimes with light-headedness, decreased PO intake. Denies abd pain, N/V, CP, SOB.

## 2020-09-09 NOTE — ED ADULT TRIAGE NOTE - CHIEF COMPLAINT QUOTE
Pt transferred from [facility] for worsening abdominal pain s/p biliary stent in August, hx of renal lesion, pt jaundice, was given zosyn and fluids PTA, sent for IR renal biopsy, on heparin drip @ 850 units/hour

## 2020-09-09 NOTE — ED ADULT NURSE NOTE - OBJECTIVE STATEMENT
Assumed pt care @1140. Pt received A&Ox3, transferred from [facility] on a Heparin gtt 850 units/hr w/ 20 g IV in L wrist. R 18g IV placed by [place], resistance upon flushing, no blood return present, to be d/c'd. Pt reweighed @ [place]. Pt 82.9 kg. Pt placed on CM w/. Pt states she got sick in rehab, c/o weakness and diarrhea. Pt has no complaints at this time. Pt skin jaundice upon inspection. Pt denies any NVD, pain, chest pain or SOB. Resp even and unlabored. VSS. No acute distress noted at this time. Pt placed on purewick external female catheter for urination. Pt safety maintained and given call bell. Pt made aware of plan of care.

## 2020-09-09 NOTE — ED ADULT NURSE NOTE - ED STAT RN HANDOFF DETAILS
Bedside report given to Zulay JACKSON. Pt brought over to Replaced by [facility] in stable condition.

## 2020-09-10 ENCOUNTER — TRANSCRIPTION ENCOUNTER (OUTPATIENT)
Age: 85
End: 2020-09-10

## 2020-09-10 ENCOUNTER — RESULT REVIEW (OUTPATIENT)
Age: 85
End: 2020-09-10

## 2020-09-10 LAB
ALBUMIN SERPL ELPH-MCNC: 2.4 G/DL — LOW (ref 3.3–5.2)
ALP SERPL-CCNC: 336 U/L — HIGH (ref 40–120)
ALT FLD-CCNC: 93 U/L — HIGH
ANION GAP SERPL CALC-SCNC: 10 MMOL/L — SIGNIFICANT CHANGE UP (ref 5–17)
APTT BLD: 135.6 SEC — CRITICAL HIGH (ref 27.5–35.5)
APTT BLD: 90.5 SEC — HIGH (ref 27.5–35.5)
APTT BLD: 96.4 SEC — HIGH (ref 27.5–35.5)
AST SERPL-CCNC: 89 U/L — HIGH
BASOPHILS # BLD AUTO: 0.03 K/UL — SIGNIFICANT CHANGE UP (ref 0–0.2)
BASOPHILS NFR BLD AUTO: 0.7 % — SIGNIFICANT CHANGE UP (ref 0–2)
BILIRUB DIRECT SERPL-MCNC: 10 MG/DL — HIGH (ref 0–0.3)
BILIRUB INDIRECT FLD-MCNC: 2.6 MG/DL — HIGH (ref 0.2–1)
BILIRUB SERPL-MCNC: 12.6 MG/DL — HIGH (ref 0.4–2)
BUN SERPL-MCNC: 4 MG/DL — LOW (ref 8–20)
CALCIUM SERPL-MCNC: 8.1 MG/DL — LOW (ref 8.6–10.2)
CANCER AG125 SERPL-ACNC: 26 U/ML — SIGNIFICANT CHANGE UP
CANCER AG19-9 SERPL-ACNC: 182 U/ML — HIGH
CEA SERPL-MCNC: 2.2 NG/ML — SIGNIFICANT CHANGE UP (ref 0–3.8)
CHLORIDE SERPL-SCNC: 106 MMOL/L — SIGNIFICANT CHANGE UP (ref 98–107)
CO2 SERPL-SCNC: 25 MMOL/L — SIGNIFICANT CHANGE UP (ref 22–29)
CREAT SERPL-MCNC: <0.2 MG/DL — LOW (ref 0.5–1.3)
CULTURE RESULTS: NO GROWTH — SIGNIFICANT CHANGE UP
EOSINOPHIL # BLD AUTO: 0.15 K/UL — SIGNIFICANT CHANGE UP (ref 0–0.5)
EOSINOPHIL NFR BLD AUTO: 3.3 % — SIGNIFICANT CHANGE UP (ref 0–6)
GLUCOSE BLDC GLUCOMTR-MCNC: 103 MG/DL — HIGH (ref 70–99)
GLUCOSE BLDC GLUCOMTR-MCNC: 123 MG/DL — HIGH (ref 70–99)
GLUCOSE BLDC GLUCOMTR-MCNC: 176 MG/DL — HIGH (ref 70–99)
GLUCOSE SERPL-MCNC: 125 MG/DL — HIGH (ref 70–99)
HCT VFR BLD CALC: 32.9 % — LOW (ref 34.5–45)
HGB BLD-MCNC: 11.1 G/DL — LOW (ref 11.5–15.5)
IMM GRANULOCYTES NFR BLD AUTO: 0.7 % — SIGNIFICANT CHANGE UP (ref 0–1.5)
INR BLD: 1.56 RATIO — HIGH (ref 0.88–1.16)
INR BLD: 1.74 RATIO — HIGH (ref 0.88–1.16)
LYMPHOCYTES # BLD AUTO: 0.86 K/UL — LOW (ref 1–3.3)
LYMPHOCYTES # BLD AUTO: 18.7 % — SIGNIFICANT CHANGE UP (ref 13–44)
MAGNESIUM SERPL-MCNC: 1.6 MG/DL — LOW (ref 1.8–2.6)
MCHC RBC-ENTMCNC: 32.3 PG — SIGNIFICANT CHANGE UP (ref 27–34)
MCHC RBC-ENTMCNC: 33.7 GM/DL — SIGNIFICANT CHANGE UP (ref 32–36)
MCV RBC AUTO: 95.6 FL — SIGNIFICANT CHANGE UP (ref 80–100)
MONOCYTES # BLD AUTO: 0.34 K/UL — SIGNIFICANT CHANGE UP (ref 0–0.9)
MONOCYTES NFR BLD AUTO: 7.4 % — SIGNIFICANT CHANGE UP (ref 2–14)
NEUTROPHILS # BLD AUTO: 3.2 K/UL — SIGNIFICANT CHANGE UP (ref 1.8–7.4)
NEUTROPHILS NFR BLD AUTO: 69.2 % — SIGNIFICANT CHANGE UP (ref 43–77)
PLATELET # BLD AUTO: 180 K/UL — SIGNIFICANT CHANGE UP (ref 150–400)
POTASSIUM SERPL-MCNC: 3.5 MMOL/L — SIGNIFICANT CHANGE UP (ref 3.5–5.3)
POTASSIUM SERPL-SCNC: 3.5 MMOL/L — SIGNIFICANT CHANGE UP (ref 3.5–5.3)
PROT SERPL-MCNC: 5.5 G/DL — LOW (ref 6.6–8.7)
PROTHROM AB SERPL-ACNC: 17.7 SEC — HIGH (ref 10.6–13.6)
PROTHROM AB SERPL-ACNC: 19.7 SEC — HIGH (ref 10.6–13.6)
RBC # BLD: 3.44 M/UL — LOW (ref 3.8–5.2)
RBC # FLD: 16.1 % — HIGH (ref 10.3–14.5)
SARS-COV-2 IGG SERPL QL IA: NEGATIVE — SIGNIFICANT CHANGE UP
SARS-COV-2 IGM SERPL IA-ACNC: <0.1 INDEX — SIGNIFICANT CHANGE UP
SODIUM SERPL-SCNC: 140 MMOL/L — SIGNIFICANT CHANGE UP (ref 135–145)
SPECIMEN SOURCE: SIGNIFICANT CHANGE UP
WBC # BLD: 4.61 K/UL — SIGNIFICANT CHANGE UP (ref 3.8–10.5)
WBC # FLD AUTO: 4.61 K/UL — SIGNIFICANT CHANGE UP (ref 3.8–10.5)

## 2020-09-10 PROCEDURE — 99233 SBSQ HOSP IP/OBS HIGH 50: CPT

## 2020-09-10 RX ORDER — INDOMETHACIN 50 MG
100 CAPSULE ORAL ONCE
Refills: 0 | Status: COMPLETED | OUTPATIENT
Start: 2020-09-11 | End: 2020-09-11

## 2020-09-10 RX ORDER — PHYTONADIONE (VIT K1) 5 MG
5 TABLET ORAL ONCE
Refills: 0 | Status: COMPLETED | OUTPATIENT
Start: 2020-09-10 | End: 2020-09-10

## 2020-09-10 RX ORDER — MAGNESIUM OXIDE 400 MG ORAL TABLET 241.3 MG
400 TABLET ORAL
Refills: 0 | Status: COMPLETED | OUTPATIENT
Start: 2020-09-10 | End: 2020-09-11

## 2020-09-10 RX ADMIN — Medication 101 MILLIGRAM(S): at 17:40

## 2020-09-10 RX ADMIN — LATANOPROST 1 DROP(S): 0.05 SOLUTION/ DROPS OPHTHALMIC; TOPICAL at 21:21

## 2020-09-10 RX ADMIN — ATENOLOL 50 MILLIGRAM(S): 25 TABLET ORAL at 17:41

## 2020-09-10 RX ADMIN — PIPERACILLIN AND TAZOBACTAM 25 GRAM(S): 4; .5 INJECTION, POWDER, LYOPHILIZED, FOR SOLUTION INTRAVENOUS at 21:21

## 2020-09-10 RX ADMIN — Medication 20 MILLIGRAM(S): at 13:46

## 2020-09-10 RX ADMIN — HEPARIN SODIUM 900 UNIT(S)/HR: 5000 INJECTION INTRAVENOUS; SUBCUTANEOUS at 21:22

## 2020-09-10 RX ADMIN — SPIRONOLACTONE 50 MILLIGRAM(S): 25 TABLET, FILM COATED ORAL at 05:20

## 2020-09-10 RX ADMIN — PIPERACILLIN AND TAZOBACTAM 25 GRAM(S): 4; .5 INJECTION, POWDER, LYOPHILIZED, FOR SOLUTION INTRAVENOUS at 01:22

## 2020-09-10 RX ADMIN — ATENOLOL 50 MILLIGRAM(S): 25 TABLET ORAL at 05:20

## 2020-09-10 RX ADMIN — PIPERACILLIN AND TAZOBACTAM 25 GRAM(S): 4; .5 INJECTION, POWDER, LYOPHILIZED, FOR SOLUTION INTRAVENOUS at 13:47

## 2020-09-10 RX ADMIN — SPIRONOLACTONE 50 MILLIGRAM(S): 25 TABLET, FILM COATED ORAL at 17:41

## 2020-09-10 RX ADMIN — Medication 5 MILLIGRAM(S): at 21:23

## 2020-09-10 RX ADMIN — HEPARIN SODIUM 900 UNIT(S)/HR: 5000 INJECTION INTRAVENOUS; SUBCUTANEOUS at 18:50

## 2020-09-10 RX ADMIN — Medication 20 MILLIGRAM(S): at 21:21

## 2020-09-10 RX ADMIN — MAGNESIUM OXIDE 400 MG ORAL TABLET 400 MILLIGRAM(S): 241.3 TABLET ORAL at 17:41

## 2020-09-10 RX ADMIN — Medication 1: at 13:47

## 2020-09-10 RX ADMIN — Medication 20 MILLIGRAM(S): at 17:41

## 2020-09-10 RX ADMIN — HEPARIN SODIUM 0 UNIT(S)/HR: 5000 INJECTION INTRAVENOUS; SUBCUTANEOUS at 03:46

## 2020-09-10 RX ADMIN — HEPARIN SODIUM 900 UNIT(S)/HR: 5000 INJECTION INTRAVENOUS; SUBCUTANEOUS at 04:55

## 2020-09-10 NOTE — PROVIDER CONTACT NOTE (OTHER) - BACKGROUND
Pt transferred from [facility] with obstruction of bile duct. Also need to know if the 1 unit of plasma that was ordered and not given on day shift, needs to be given still prior to the procedure

## 2020-09-10 NOTE — PROVIDER CONTACT NOTE (OTHER) - SITUATION
Pt is going for ERCp in the AM. There are two separate orders with different times to stop heparin gtt. need clarification.

## 2020-09-10 NOTE — PROGRESS NOTE ADULT - SUBJECTIVE AND OBJECTIVE BOX
TEVIN CASANOVA    [id number]    89y      Female    CC: jaundice    INTERVAL HPI/OVERNIGHT EVENTS: Pt seen and examined. feeling "woozy," overall fatigue, but no overt dizziness, no n/v, denies abd pain, blurred vision, HA.     REVIEW OF SYSTEMS:    CONSTITUTIONAL: No fever  RESPIRATORY: No cough, wheezing, hemoptysis; No shortness of breath  CARDIOVASCULAR: No chest pain, palpitations  GASTROINTESTINAL: No abdominal or epigastric pain. No nausea, vomiting  NEUROLOGICAL: No headaches, memory loss    Vital Signs Last 24 Hrs  T(C): 36.6 (10 Sep 2020 04:53), Max: 36.8 (09 Sep 2020 18:44)  T(F): 97.9 (10 Sep 2020 04:53), Max: 98.2 (09 Sep 2020 18:44)  HR: 67 (10 Sep 2020 04:53) (67 - 80)  BP: 135/81 (10 Sep 2020 04:53) (118/80 - 166/86)  BP(mean): --  RR: 18 (10 Sep 2020 04:53) (18 - 19)  SpO2: 97% (09 Sep 2020 20:02) (96% - 99%)    PHYSICAL EXAM:    GENERAL: NAD  HEENT: +EOMI, scleral icterus  NECK: soft, supple  CHEST/LUNG: Clear to auscultation bilaterally; No wheezing  HEART: S1S2+, Regular rate and rhythm; No murmurs, rubs, or gallops  ABDOMEN: Soft, Nontender, Nondistended; Bowel sounds present  SKIN: warm, dry; +jaundice  NEURO: AAOX3, no focal deficits  PSYCH: normal affect    LABS:                        11.1   4.61  )-----------( 180      ( 10 Sep 2020 08:03 )             32.9     09-10    140  |  106  |  4.0<L>  ----------------------------<  125<H>  3.5   |  25.0  |  <0.20<L>    Ca    8.1<L>      10 Sep 2020 08:03  Mg     1.6     09-10    TPro  5.5<L>  /  Alb  2.4<L>  /  TBili  12.6<H>  /  DBili  10.0<H>  /  AST  89<H>  /  ALT  93<H>  /  AlkPhos  336<H>  09-10    PT/INR - ( 10 Sep 2020 08:03 )   PT: 19.7 sec;   INR: 1.74 ratio         PTT - ( 10 Sep 2020 02:55 )  PTT:135.6 sec  Urinalysis Basic - ( 09 Sep 2020 12:33 )    Color: Yellow / Appearance: Clear / S.010 / pH: x  Gluc: x / Ketone: Negative  / Bili: Moderate / Urobili: 1 mg/dL   Blood: x / Protein: 15 mg/dL / Nitrite: Negative   Leuk Esterase: Negative / RBC: 0-2 /HPF / WBC 0-2   Sq Epi: x / Non Sq Epi: Occasional / Bacteria: Negative          MEDICATIONS  (STANDING):  ATENolol  Tablet 50 milliGRAM(s) Oral two times a day  dextrose 5%. 1000 milliLiter(s) (50 mL/Hr) IV Continuous <Continuous>  dextrose 50% Injectable 12.5 Gram(s) IV Push once  dextrose 50% Injectable 25 Gram(s) IV Push once  dextrose 50% Injectable 25 Gram(s) IV Push once  dicyclomine 20 milliGRAM(s) Oral four times a day before meals  heparin  Infusion.  Unit(s)/Hr (15 mL/Hr) IV Continuous <Continuous>  indomethacin Suppository 100 milliGRAM(s) Rectal once  insulin lispro (HumaLOG) corrective regimen sliding scale   SubCutaneous three times a day before meals  insulin lispro (HumaLOG) corrective regimen sliding scale   SubCutaneous at bedtime  latanoprost 0.005% Ophthalmic Solution 1 Drop(s) Both EYES at bedtime  magnesium oxide 400 milliGRAM(s) Oral three times a day with meals  melatonin 5 milliGRAM(s) Oral at bedtime  phytonadione  IVPB 5 milliGRAM(s) IV Intermittent once  piperacillin/tazobactam IVPB.. 3.375 Gram(s) IV Intermittent every 8 hours  sodium chloride 0.9%. 1000 milliLiter(s) (50 mL/Hr) IV Continuous <Continuous>  spironolactone Oral Tab/Cap - Peds 50 milliGRAM(s) Oral two times a day    MEDICATIONS  (PRN):  bisacodyl 5 milliGRAM(s) Oral every 12 hours PRN Constipation  dextrose 40% Gel 15 Gram(s) Oral once PRN Blood Glucose LESS THAN 70 milliGRAM(s)/deciliter  glucagon  Injectable 1 milliGRAM(s) IntraMuscular once PRN Glucose LESS THAN 70 milligrams/deciliter  heparin   Injectable 6500 Unit(s) IV Push every 6 hours PRN For aPTT less than 40  heparin   Injectable 3000 Unit(s) IV Push every 6 hours PRN For aPTT between 40 - 57      RADIOLOGY & ADDITIONAL TESTS:

## 2020-09-10 NOTE — PROGRESS NOTE ADULT - SUBJECTIVE AND OBJECTIVE BOX
INTERVAL HPI/OVERNIGHT EVENTS:FU for obstructive jaundice. Evaluated by cardiology. On IV heparin.     MEDICATIONS  (STANDING):  ATENolol  Tablet 50 milliGRAM(s) Oral two times a day  dextrose 5%. 1000 milliLiter(s) (50 mL/Hr) IV Continuous <Continuous>  dextrose 50% Injectable 12.5 Gram(s) IV Push once  dextrose 50% Injectable 25 Gram(s) IV Push once  dextrose 50% Injectable 25 Gram(s) IV Push once  dicyclomine 20 milliGRAM(s) Oral four times a day before meals  heparin  Infusion.  Unit(s)/Hr (15 mL/Hr) IV Continuous <Continuous>  insulin lispro (HumaLOG) corrective regimen sliding scale   SubCutaneous three times a day before meals  insulin lispro (HumaLOG) corrective regimen sliding scale   SubCutaneous at bedtime  latanoprost 0.005% Ophthalmic Solution 1 Drop(s) Both EYES at bedtime  melatonin 5 milliGRAM(s) Oral at bedtime  piperacillin/tazobactam IVPB.. 3.375 Gram(s) IV Intermittent every 8 hours  sodium chloride 0.9%. 1000 milliLiter(s) (50 mL/Hr) IV Continuous <Continuous>  spironolactone Oral Tab/Cap - Peds 50 milliGRAM(s) Oral two times a day    MEDICATIONS  (PRN):  bisacodyl 5 milliGRAM(s) Oral every 12 hours PRN Constipation  dextrose 40% Gel 15 Gram(s) Oral once PRN Blood Glucose LESS THAN 70 milliGRAM(s)/deciliter  glucagon  Injectable 1 milliGRAM(s) IntraMuscular once PRN Glucose LESS THAN 70 milligrams/deciliter  heparin   Injectable 6500 Unit(s) IV Push every 6 hours PRN For aPTT less than 40  heparin   Injectable 3000 Unit(s) IV Push every 6 hours PRN For aPTT between 40 - 57      Allergies    clindamycin (Rash)  penicillin (Rash)  sulfa drugs (Unknown)    Intolerances        Vital Signs Last 24 Hrs  T(C): 36.6 (10 Sep 2020 04:53), Max: 36.8 (09 Sep 2020 18:44)  T(F): 97.9 (10 Sep 2020 04:53), Max: 98.2 (09 Sep 2020 18:44)  HR: 67 (10 Sep 2020 04:53) (64 - 80)  BP: 135/81 (10 Sep 2020 04:53) (118/80 - 166/86)  BP(mean): --  RR: 18 (10 Sep 2020 04:53) (18 - 19)  SpO2: 97% (09 Sep 2020 20:02) (96% - 99%)    LABS:                        13.2   5.06  )-----------( 263      ( 09 Sep 2020 12:34 )             38.3         137  |  103  |  6.0<L>  ----------------------------<  128<H>  4.2   |  22.0  |  <0.20<L>    Ca    8.6      09 Sep 2020 13:47    TPro  6.6  /  Alb  2.6<L>  /  TBili  14.6<H>  /  DBili  >10.0<H>  /  AST  114<H>  /  ALT  110<H>  /  AlkPhos  398<H>      PT/INR - ( 09 Sep 2020 13:10 )   PT: 19.4 sec;   INR: 1.72 ratio         PTT - ( 10 Sep 2020 02:55 )  PTT:135.6 sec  Urinalysis Basic - ( 09 Sep 2020 12:33 )    Color: Yellow / Appearance: Clear / S.010 / pH: x  Gluc: x / Ketone: Negative  / Bili: Moderate / Urobili: 1 mg/dL   Blood: x / Protein: 15 mg/dL / Nitrite: Negative   Leuk Esterase: Negative / RBC: 0-2 /HPF / WBC 0-2   Sq Epi: x / Non Sq Epi: Occasional / Bacteria: Negative        RADIOLOGY & ADDITIONAL TESTS:

## 2020-09-11 ENCOUNTER — RESULT REVIEW (OUTPATIENT)
Age: 85
End: 2020-09-11

## 2020-09-11 DIAGNOSIS — R17 UNSPECIFIED JAUNDICE: ICD-10-CM

## 2020-09-11 DIAGNOSIS — I95.9 HYPOTENSION, UNSPECIFIED: ICD-10-CM

## 2020-09-11 DIAGNOSIS — I48.91 UNSPECIFIED ATRIAL FIBRILLATION: ICD-10-CM

## 2020-09-11 LAB
ALBUMIN SERPL ELPH-MCNC: 2.8 G/DL — LOW (ref 3.3–5.2)
ALBUMIN SERPL ELPH-MCNC: 2.9 G/DL — LOW (ref 3.3–5.2)
ALP SERPL-CCNC: 352 U/L — HIGH (ref 40–120)
ALP SERPL-CCNC: 378 U/L — HIGH (ref 40–120)
ALT FLD-CCNC: 88 U/L — HIGH
ALT FLD-CCNC: 91 U/L — HIGH
ANION GAP SERPL CALC-SCNC: 12 MMOL/L — SIGNIFICANT CHANGE UP (ref 5–17)
ANION GAP SERPL CALC-SCNC: 12 MMOL/L — SIGNIFICANT CHANGE UP (ref 5–17)
APTT BLD: 32.3 SEC — SIGNIFICANT CHANGE UP (ref 27.5–35.5)
APTT BLD: 32.7 SEC — SIGNIFICANT CHANGE UP (ref 27.5–35.5)
APTT BLD: 82.5 SEC — HIGH (ref 27.5–35.5)
AST SERPL-CCNC: 86 U/L — HIGH
AST SERPL-CCNC: 88 U/L — HIGH
BILIRUB DIRECT SERPL-MCNC: >10 MG/DL — HIGH (ref 0–0.3)
BILIRUB INDIRECT FLD-MCNC: <4.4 MG/DL — HIGH (ref 0.2–1)
BILIRUB SERPL-MCNC: 14.4 MG/DL — HIGH (ref 0.4–2)
BILIRUB SERPL-MCNC: 15.5 MG/DL — HIGH (ref 0.4–2)
BLD GP AB SCN SERPL QL: SIGNIFICANT CHANGE UP
BUN SERPL-MCNC: 5 MG/DL — LOW (ref 8–20)
BUN SERPL-MCNC: 6 MG/DL — LOW (ref 8–20)
CALCIUM SERPL-MCNC: 10.1 MG/DL — SIGNIFICANT CHANGE UP (ref 8.6–10.2)
CALCIUM SERPL-MCNC: 8.8 MG/DL — SIGNIFICANT CHANGE UP (ref 8.6–10.2)
CHLORIDE SERPL-SCNC: 101 MMOL/L — SIGNIFICANT CHANGE UP (ref 98–107)
CHLORIDE SERPL-SCNC: 97 MMOL/L — LOW (ref 98–107)
CO2 SERPL-SCNC: 25 MMOL/L — SIGNIFICANT CHANGE UP (ref 22–29)
CO2 SERPL-SCNC: 26 MMOL/L — SIGNIFICANT CHANGE UP (ref 22–29)
CREAT SERPL-MCNC: <0.2 MG/DL — LOW (ref 0.5–1.3)
CREAT SERPL-MCNC: <0.2 MG/DL — LOW (ref 0.5–1.3)
GLUCOSE BLDC GLUCOMTR-MCNC: 109 MG/DL — HIGH (ref 70–99)
GLUCOSE BLDC GLUCOMTR-MCNC: 141 MG/DL — HIGH (ref 70–99)
GLUCOSE BLDC GLUCOMTR-MCNC: 183 MG/DL — HIGH (ref 70–99)
GLUCOSE BLDC GLUCOMTR-MCNC: 96 MG/DL — SIGNIFICANT CHANGE UP (ref 70–99)
GLUCOSE SERPL-MCNC: 130 MG/DL — HIGH (ref 70–99)
GLUCOSE SERPL-MCNC: 172 MG/DL — HIGH (ref 70–99)
HCT VFR BLD CALC: 35.6 % — SIGNIFICANT CHANGE UP (ref 34.5–45)
HCT VFR BLD CALC: 35.9 % — SIGNIFICANT CHANGE UP (ref 34.5–45)
HGB BLD-MCNC: 12.2 G/DL — SIGNIFICANT CHANGE UP (ref 11.5–15.5)
HGB BLD-MCNC: 12.6 G/DL — SIGNIFICANT CHANGE UP (ref 11.5–15.5)
INR BLD: 1.23 RATIO — HIGH (ref 0.88–1.16)
INR BLD: 1.25 RATIO — HIGH (ref 0.88–1.16)
MAGNESIUM SERPL-MCNC: 1.5 MG/DL — LOW (ref 1.6–2.6)
MAGNESIUM SERPL-MCNC: 1.8 MG/DL — SIGNIFICANT CHANGE UP (ref 1.6–2.6)
MCHC RBC-ENTMCNC: 32.4 PG — SIGNIFICANT CHANGE UP (ref 27–34)
MCHC RBC-ENTMCNC: 33.3 PG — SIGNIFICANT CHANGE UP (ref 27–34)
MCHC RBC-ENTMCNC: 34.3 GM/DL — SIGNIFICANT CHANGE UP (ref 32–36)
MCHC RBC-ENTMCNC: 35.1 GM/DL — SIGNIFICANT CHANGE UP (ref 32–36)
MCV RBC AUTO: 94.7 FL — SIGNIFICANT CHANGE UP (ref 80–100)
MCV RBC AUTO: 95 FL — SIGNIFICANT CHANGE UP (ref 80–100)
PLATELET # BLD AUTO: 198 K/UL — SIGNIFICANT CHANGE UP (ref 150–400)
PLATELET # BLD AUTO: 217 K/UL — SIGNIFICANT CHANGE UP (ref 150–400)
POTASSIUM SERPL-MCNC: 3.1 MMOL/L — LOW (ref 3.5–5.3)
POTASSIUM SERPL-MCNC: 3.9 MMOL/L — SIGNIFICANT CHANGE UP (ref 3.5–5.3)
POTASSIUM SERPL-SCNC: 3.1 MMOL/L — LOW (ref 3.5–5.3)
POTASSIUM SERPL-SCNC: 3.9 MMOL/L — SIGNIFICANT CHANGE UP (ref 3.5–5.3)
PROT SERPL-MCNC: 6.3 G/DL — LOW (ref 6.6–8.7)
PROT SERPL-MCNC: 6.4 G/DL — LOW (ref 6.6–8.7)
PROTHROM AB SERPL-ACNC: 14.1 SEC — HIGH (ref 10.6–13.6)
PROTHROM AB SERPL-ACNC: 14.3 SEC — HIGH (ref 10.6–13.6)
RBC # BLD: 3.76 M/UL — LOW (ref 3.8–5.2)
RBC # BLD: 3.78 M/UL — LOW (ref 3.8–5.2)
RBC # FLD: 16 % — HIGH (ref 10.3–14.5)
RBC # FLD: 16.1 % — HIGH (ref 10.3–14.5)
SODIUM SERPL-SCNC: 134 MMOL/L — LOW (ref 135–145)
SODIUM SERPL-SCNC: 138 MMOL/L — SIGNIFICANT CHANGE UP (ref 135–145)
WBC # BLD: 4.73 K/UL — SIGNIFICANT CHANGE UP (ref 3.8–10.5)
WBC # BLD: 7.21 K/UL — SIGNIFICANT CHANGE UP (ref 3.8–10.5)
WBC # FLD AUTO: 4.73 K/UL — SIGNIFICANT CHANGE UP (ref 3.8–10.5)
WBC # FLD AUTO: 7.21 K/UL — SIGNIFICANT CHANGE UP (ref 3.8–10.5)

## 2020-09-11 PROCEDURE — 88173 CYTOPATH EVAL FNA REPORT: CPT | Mod: 26

## 2020-09-11 PROCEDURE — 88305 TISSUE EXAM BY PATHOLOGIST: CPT | Mod: 26

## 2020-09-11 PROCEDURE — 43276 ERCP STENT EXCHANGE W/DILATE: CPT

## 2020-09-11 PROCEDURE — 88300 SURGICAL PATH GROSS: CPT | Mod: 26

## 2020-09-11 PROCEDURE — 43242 EGD US FINE NEEDLE BX/ASPIR: CPT | Mod: 59

## 2020-09-11 PROCEDURE — 99233 SBSQ HOSP IP/OBS HIGH 50: CPT

## 2020-09-11 RX ORDER — POTASSIUM CHLORIDE 20 MEQ
40 PACKET (EA) ORAL ONCE
Refills: 0 | Status: DISCONTINUED | OUTPATIENT
Start: 2020-09-11 | End: 2020-09-15

## 2020-09-11 RX ORDER — POTASSIUM CHLORIDE 20 MEQ
10 PACKET (EA) ORAL ONCE
Refills: 0 | Status: COMPLETED | OUTPATIENT
Start: 2020-09-11 | End: 2020-09-11

## 2020-09-11 RX ORDER — SACCHAROMYCES BOULARDII 250 MG
250 POWDER IN PACKET (EA) ORAL
Refills: 0 | Status: DISCONTINUED | OUTPATIENT
Start: 2020-09-11 | End: 2020-09-15

## 2020-09-11 RX ORDER — MAGNESIUM SULFATE 500 MG/ML
4 VIAL (ML) INJECTION ONCE
Refills: 0 | Status: COMPLETED | OUTPATIENT
Start: 2020-09-11 | End: 2020-09-11

## 2020-09-11 RX ADMIN — Medication 100 GRAM(S): at 21:06

## 2020-09-11 RX ADMIN — Medication 250 MILLIGRAM(S): at 17:24

## 2020-09-11 RX ADMIN — SODIUM CHLORIDE 50 MILLILITER(S): 9 INJECTION INTRAMUSCULAR; INTRAVENOUS; SUBCUTANEOUS at 21:49

## 2020-09-11 RX ADMIN — Medication 100 MILLIEQUIVALENT(S): at 10:04

## 2020-09-11 RX ADMIN — SODIUM CHLORIDE 50 MILLILITER(S): 9 INJECTION INTRAMUSCULAR; INTRAVENOUS; SUBCUTANEOUS at 17:25

## 2020-09-11 RX ADMIN — Medication 100 MILLIGRAM(S): at 22:00

## 2020-09-11 RX ADMIN — SPIRONOLACTONE 50 MILLIGRAM(S): 25 TABLET, FILM COATED ORAL at 17:24

## 2020-09-11 RX ADMIN — Medication 100 MILLIGRAM(S): at 21:46

## 2020-09-11 RX ADMIN — ATENOLOL 50 MILLIGRAM(S): 25 TABLET ORAL at 05:36

## 2020-09-11 RX ADMIN — HEPARIN SODIUM 1300 UNIT(S)/HR: 5000 INJECTION INTRAVENOUS; SUBCUTANEOUS at 21:20

## 2020-09-11 RX ADMIN — PIPERACILLIN AND TAZOBACTAM 25 GRAM(S): 4; .5 INJECTION, POWDER, LYOPHILIZED, FOR SOLUTION INTRAVENOUS at 21:10

## 2020-09-11 RX ADMIN — HEPARIN SODIUM 6500 UNIT(S): 5000 INJECTION INTRAVENOUS; SUBCUTANEOUS at 21:21

## 2020-09-11 RX ADMIN — Medication 20 MILLIGRAM(S): at 17:24

## 2020-09-11 RX ADMIN — PIPERACILLIN AND TAZOBACTAM 25 GRAM(S): 4; .5 INJECTION, POWDER, LYOPHILIZED, FOR SOLUTION INTRAVENOUS at 16:59

## 2020-09-11 RX ADMIN — SODIUM CHLORIDE 50 MILLILITER(S): 9 INJECTION INTRAMUSCULAR; INTRAVENOUS; SUBCUTANEOUS at 05:42

## 2020-09-11 RX ADMIN — PIPERACILLIN AND TAZOBACTAM 25 GRAM(S): 4; .5 INJECTION, POWDER, LYOPHILIZED, FOR SOLUTION INTRAVENOUS at 05:36

## 2020-09-11 RX ADMIN — Medication 20 MILLIGRAM(S): at 21:46

## 2020-09-11 RX ADMIN — ATENOLOL 50 MILLIGRAM(S): 25 TABLET ORAL at 17:25

## 2020-09-11 RX ADMIN — Medication 5 MILLIGRAM(S): at 21:59

## 2020-09-11 RX ADMIN — LATANOPROST 1 DROP(S): 0.05 SOLUTION/ DROPS OPHTHALMIC; TOPICAL at 21:59

## 2020-09-11 NOTE — BRIEF OPERATIVE NOTE - OPERATION/FINDINGS
EGD: Duodenal sweep narrowing.   EUS: 18 mm x18 mm hypoechoic pancreatic head mass s/p FNB  ERCP: Occluded bile duct stent and removed with snare. Then 10 mm x 60 mm uncovered metal bile duct stent placed with drainage of large amount of thick bile.

## 2020-09-11 NOTE — CONSULT NOTE ADULT - SUBJECTIVE AND OBJECTIVE BOX
88 yo F hx of Afib on xarelto (last dose 9/4, now on heparin infusion), HTN, CHF, chronic pancreatitis, biliary stent placed 8/20, presented to [facility] from rehab for generalized weakness. She was found jaundice.  US and MRCP showed dilated CBD and pancreatic duct, stent within common bile duct, hydropic gallbladder with multiple gallstone. Patient was transferred to [place] for ERCP by . Patient is DNR/DNI but agreeable to precedure. patient s.p 4 days of zosyn.    Pt had a temporary biliary stent which had become obstructed and was changed to a metal stent today.  She was found to have pancreatic mass which was biopsied during procedure. She was in [facility] today for ERCP when she had episode of hypotension to SBP to 40s and Afib with RVR.  Pt had been intubated for procedure but arrives to ICU extubated.  Her hypotension has resolved at this time.  She had a radial Yvonne in place.  She is awake and able to answer questions appropriately.  Dr Mcdaniel updated daughter via phone.  She had been on Heparin gtt for Afib which was held overnight and as per GI can be restarted in 4 hours post procedure.  As per GI pt can have PO diet/fluids as she wants.    Patient is a 89y old  Female who presents with a chief complaint of Obstructive jaundice (11 Sep 2020 14:13)      PAST MEDICAL & SURGICAL HISTORY:  Glaucoma  Hypertension  Afib  S/P hysterectomy      Review of Systems:  CONSTITUTIONAL: c/o mild HA and sore throat  EYES: No eye pain, visual disturbances, or discharge  ENMT:  No difficulty hearing, tinnitus, vertigo; No sinus or throat pain  NECK: No pain or stiffness  RESPIRATORY: No cough, wheezing, chills or hemoptysis; No shortness of breath  CARDIOVASCULAR: No chest pain, palpitations, dizziness, or leg swelling  GASTROINTESTINAL: No abdominal or epigastric pain. No nausea, vomiting, or hematemesis; No diarrhea or constipation. No melena or hematochezia.  GENITOURINARY: No dysuria, frequency, hematuria, or incontinence  NEUROLOGICAL: No headaches, memory loss, loss of strength, numbness, or tremors  SKIN: No itching, burning, rashes, or lesions   MUSCULOSKELETAL: No joint pain or swelling; No muscle, back, or extremity pain  PSYCHIATRIC: No depression, anxiety, mood swings, or difficulty sleeping      Medications:  piperacillin/tazobactam IVPB.. 3.375 Gram(s) IV Intermittent every 8 hours    ATENolol  Tablet 50 milliGRAM(s) Oral two times a day  spironolactone Oral Tab/Cap - Peds 50 milliGRAM(s) Oral two times a day      indomethacin Suppository 100 milliGRAM(s) Rectal once  melatonin 5 milliGRAM(s) Oral at bedtime      heparin   Injectable 6500 Unit(s) IV Push every 6 hours PRN  heparin   Injectable 3000 Unit(s) IV Push every 6 hours PRN  heparin  Infusion.  Unit(s)/Hr IV Continuous <Continuous>    bisacodyl 5 milliGRAM(s) Oral every 12 hours PRN  dicyclomine 20 milliGRAM(s) Oral four times a day before meals      dextrose 40% Gel 15 Gram(s) Oral once PRN  dextrose 50% Injectable 12.5 Gram(s) IV Push once  dextrose 50% Injectable 25 Gram(s) IV Push once  dextrose 50% Injectable 25 Gram(s) IV Push once  glucagon  Injectable 1 milliGRAM(s) IntraMuscular once PRN  insulin lispro (HumaLOG) corrective regimen sliding scale   SubCutaneous three times a day before meals  insulin lispro (HumaLOG) corrective regimen sliding scale   SubCutaneous at bedtime    dextrose 5%. 1000 milliLiter(s) IV Continuous <Continuous>  potassium chloride    Tablet ER 40 milliEquivalent(s) Oral once  sodium chloride 0.9%. 1000 milliLiter(s) IV Continuous <Continuous>      latanoprost 0.005% Ophthalmic Solution 1 Drop(s) Both EYES at bedtime    saccharomyces boulardii 250 milliGRAM(s) Oral two times a day          ICU Vital Signs Last 24 Hrs  T(C): 36.6 (11 Sep 2020 11:17), Max: 36.7 (11 Sep 2020 04:28)  T(F): 97.9 (11 Sep 2020 11:17), Max: 98 (11 Sep 2020 04:28)  HR: 77 (11 Sep 2020 11:17) (62 - 77)  BP: 135/80 (11 Sep 2020 11:17) (118/71 - 141/82)  BP(mean): --  ABP: --  ABP(mean): --  RR: 18 (11 Sep 2020 11:17) (18 - 18)  SpO2: 97% (11 Sep 2020 05:34) (97% - 98%)    LABS:                        12.2   4.73  )-----------( 198      ( 11 Sep 2020 07:57 )             35.6     09-11    138  |  101  |  5.0<L>  ----------------------------<  130<H>  3.1<L>   |  26.0  |  <0.20<L>    Ca    8.8      11 Sep 2020 07:57  Mg     1.8     09-11    TPro  6.3<L>  /  Alb  2.9<L>  /  TBili  14.4<H>  /  DBili  >10.0<H>  /  AST  86<H>  /  ALT  91<H>  /  AlkPhos  352<H>  09-11          CAPILLARY BLOOD GLUCOSE      POCT Blood Glucose.: 96 mg/dL (11 Sep 2020 11:49)    PT/INR - ( 11 Sep 2020 07:57 )   PT: 14.1 sec;   INR: 1.23 ratio         PTT - ( 11 Sep 2020 07:57 )  PTT:32.7 sec    CULTURES:  Culture Results:   No growth (09-09 @ 22:09)      Physical Examination:    General: No acute distress.  Alert, oriented, interactive, nonfocal.  Very jaundiced    HEENT: Pupils equal, reactive to light.  Symmetric. Icteric    PULM: Slightly diminished at base bilaterally, no significant sputum production    CVS: Afib rate controlled at this time, no murmurs, rubs, or gallops    ABD: Soft, nondistended, nontender, normoactive bowel sounds, no masses    EXT: No edema, nontender    SKIN: Warm and well perfused, no rashes noted.    RADIOLOGY:images reviewed     CRITICAL CARE TIME SPENT: 45 min

## 2020-09-11 NOTE — CONSULT NOTE ADULT - ATTENDING COMMENTS
Patient seen and examined by me.  I have discussed my recommendation with the PA which are outlined above.  Will sign off
88 yo female transferred to ICU after ERCP/EUS for post procedure monitoring.  Currently hemodynamically stable, awake and alert, on nasal cannula. Will monitor, check labs, then transfer to monitored unit.

## 2020-09-11 NOTE — CONSULT NOTE ADULT - PROBLEM SELECTOR RECOMMENDATION 9
possibly related to sedation vs vagal episode during endoscopy that required intubation requiring phenylephrine IVP for resuscitation  pt was successfully extubated post procedure  she is currently hemodynamically stable  she has darryl/radial and will be monitored

## 2020-09-11 NOTE — BRIEF OPERATIVE NOTE - NSICDXBRIEFPOSTOP_GEN_ALL_CORE_FT
POST-OP DIAGNOSIS:  Occlusion, bile duct 11-Sep-2020 16:07:57  Cortes Mcdaniel  Pancreatic mass 11-Sep-2020 16:07:38  Cortes Mcdaniel

## 2020-09-11 NOTE — BRIEF OPERATIVE NOTE - NSICDXBRIEFPROCEDURE_GEN_ALL_CORE_FT
PROCEDURES:  ERCP, with stent replacement 11-Sep-2020 16:06:45  Cortes Mcdaniel  ERCP with stent removal 11-Sep-2020 16:06:28  Cortes Mcdaniel  EGD, with endoscopic US, with monitored anesthesia care 11-Sep-2020 16:05:36  Cortes Mcdaniel

## 2020-09-11 NOTE — PROGRESS NOTE ADULT - SUBJECTIVE AND OBJECTIVE BOX
TEVIN CASANOVA    [id number]    89y      Female    CC: jaundice    INTERVAL HPI/OVERNIGHT EVENTS: Pt seen and examined. Feeling tired, hungry. seen prior to ERCP    REVIEW OF SYSTEMS:    CONSTITUTIONAL: No fever, weight loss  RESPIRATORY: No cough, wheezing, hemoptysis; No shortness of breath  CARDIOVASCULAR: No chest pain, palpitations  GASTROINTESTINAL: No abdominal or epigastric pain. No nausea, vomiting  NEUROLOGICAL: No headaches, memory loss    Vital Signs Last 24 Hrs  T(C): 36.6 (11 Sep 2020 11:17), Max: 36.7 (11 Sep 2020 04:28)  T(F): 97.9 (11 Sep 2020 11:17), Max: 98 (11 Sep 2020 04:28)  HR: 77 (11 Sep 2020 11:17) (62 - 77)  BP: 135/80 (11 Sep 2020 11:17) (118/71 - 141/82)  BP(mean): --  RR: 18 (11 Sep 2020 11:17) (18 - 18)  SpO2: 97% (11 Sep 2020 05:34) (97% - 98%)    PHYSICAL EXAM:    GENERAL: NAD  HEENT:  +EOMI, scleral icterus   NECK: soft, supple  CHEST/LUNG: Clear to auscultation bilaterally  HEART: S1S2+, irregularly irregular  ABDOMEN: Soft, Nontender, Nondistended; Bowel sounds present  SKIN: warm, dry; jaundice  NEURO: AAOX3, no focal deficits  PSYCH: normal affect    LABS:                        12.2   4.73  )-----------( 198      ( 11 Sep 2020 07:57 )             35.6     09-11    138  |  101  |  5.0<L>  ----------------------------<  130<H>  3.1<L>   |  26.0  |  <0.20<L>    Ca    8.8      11 Sep 2020 07:57  Mg     1.8     09-11    TPro  6.3<L>  /  Alb  2.9<L>  /  TBili  14.4<H>  /  DBili  >10.0<H>  /  AST  86<H>  /  ALT  91<H>  /  AlkPhos  352<H>  09-11    PT/INR - ( 11 Sep 2020 07:57 )   PT: 14.1 sec;   INR: 1.23 ratio         PTT - ( 11 Sep 2020 07:57 )  PTT:32.7 sec        MEDICATIONS  (STANDING):  ATENolol  Tablet 50 milliGRAM(s) Oral two times a day  dextrose 5%. 1000 milliLiter(s) (50 mL/Hr) IV Continuous <Continuous>  dextrose 50% Injectable 12.5 Gram(s) IV Push once  dextrose 50% Injectable 25 Gram(s) IV Push once  dextrose 50% Injectable 25 Gram(s) IV Push once  dicyclomine 20 milliGRAM(s) Oral four times a day before meals  heparin  Infusion.  Unit(s)/Hr (15 mL/Hr) IV Continuous <Continuous>  indomethacin Suppository 100 milliGRAM(s) Rectal once  insulin lispro (HumaLOG) corrective regimen sliding scale   SubCutaneous three times a day before meals  insulin lispro (HumaLOG) corrective regimen sliding scale   SubCutaneous at bedtime  latanoprost 0.005% Ophthalmic Solution 1 Drop(s) Both EYES at bedtime  melatonin 5 milliGRAM(s) Oral at bedtime  piperacillin/tazobactam IVPB.. 3.375 Gram(s) IV Intermittent every 8 hours  potassium chloride    Tablet ER 40 milliEquivalent(s) Oral once  saccharomyces boulardii 250 milliGRAM(s) Oral two times a day  sodium chloride 0.9%. 1000 milliLiter(s) (50 mL/Hr) IV Continuous <Continuous>  spironolactone Oral Tab/Cap - Peds 50 milliGRAM(s) Oral two times a day    MEDICATIONS  (PRN):  bisacodyl 5 milliGRAM(s) Oral every 12 hours PRN Constipation  dextrose 40% Gel 15 Gram(s) Oral once PRN Blood Glucose LESS THAN 70 milliGRAM(s)/deciliter  glucagon  Injectable 1 milliGRAM(s) IntraMuscular once PRN Glucose LESS THAN 70 milligrams/deciliter  heparin   Injectable 6500 Unit(s) IV Push every 6 hours PRN For aPTT less than 40  heparin   Injectable 3000 Unit(s) IV Push every 6 hours PRN For aPTT between 40 - 57      RADIOLOGY & ADDITIONAL TESTS:

## 2020-09-11 NOTE — CONSULT NOTE ADULT - ASSESSMENT
A/P:  88yo Female w/PMH chronic Afib on xarelto (last dose 9/4), HFpEF (TTE 7/2018: EF 55-60%), HTN, glaucoma. Per record "admitted to [facility] on about 8/5 with jaundice as well as abdominal pain and a 30 pound weight loss over past 3-4 months.  W/U with cat scan and MRI showed a dilated pancreatic duct and some minor CBD dilation with possible stricture in proximal pancreatic duct near the ampulla. On 8/6 she underwent an ERCP by Dr. Roth revealing a ? stricture of the CBD just proximal to the ampulla and a tortuous pancreatic duct. Brushings were performed and a 5cm X 8.5 F CBD stent was placed. Imaging did not show any pancreatic lesions although there were some small pancreatic cysts present. Cytology showed atypical cells. She also experienced post ERCP pancreatitis which resolved. She was discharged to rehab and was lost to F/U. She was readmitted to [facility] 9/5 after staff at rehab noted jaundice. Patient continues with malaise, light headiness and weakness but no abdominal pain, nausea or vomiting. She tolerates PO intake. Patient now transferred here for EUS/ERCP."  Patient reports no Hx of anginal symptoms, and having good functional capacity at baseline.  Records obtained from cardiologist Dr. Glen Contreras ([phone]).     Cardiac Risk Stratification  -Pre-operative cardiovascular risk evaluation and management. No cardiac symptoms. Non-ischemic ECG. METs > 4/undetermined.  RCRI (revised cardiac risk index score) 6.6%. Luna perioperative cardiac risk score <1%.  Moderate risk patient for low risk procedure. Benefits of surgery outweigh the risk. No further cardiac work up is needed.  Further cardiac work up will not change risk of the patient.  No active chest pain, acute coronary syndrome, decompensated CHF, significant valvular abnormality, or unstable arrhythmia.  Patient is currently optimized from a cardiac standpoint therefore no absolute cardiac contraindication to proceeding with procedure.    AFib  -currently w/controlled ventricular response  -c/w Atenolol 50mg PO BID  -c/w Heparin gtt, hold 4hrs p/t procedure  -resume Xarelto as soon as deemed safe by GI    HTN  -currently well controlled on current medication regimen    HfpEF  -no s/s of volume overload  -c/w spironolactone 50mg PO QD    Thank you for allowing me to participate in care of your patient.   Please call as needed. Will sign off.
89 YOF with pancreatic mass and biliary obstruction, jaundice s/p ERCP for placement of metal stent with hypotension and Afib with RVR

## 2020-09-12 DIAGNOSIS — K83.1 OBSTRUCTION OF BILE DUCT: ICD-10-CM

## 2020-09-12 DIAGNOSIS — I10 ESSENTIAL (PRIMARY) HYPERTENSION: ICD-10-CM

## 2020-09-12 DIAGNOSIS — K86.89 OTHER SPECIFIED DISEASES OF PANCREAS: ICD-10-CM

## 2020-09-12 LAB
ALBUMIN SERPL ELPH-MCNC: 2.7 G/DL — LOW (ref 3.3–5.2)
ALP SERPL-CCNC: 367 U/L — HIGH (ref 40–120)
ALT FLD-CCNC: 85 U/L — HIGH
ANION GAP SERPL CALC-SCNC: 12 MMOL/L — SIGNIFICANT CHANGE UP (ref 5–17)
APTT BLD: 141.8 SEC — CRITICAL HIGH (ref 27.5–35.5)
APTT BLD: >200 SEC — CRITICAL HIGH (ref 27.5–35.5)
AST SERPL-CCNC: 81 U/L — HIGH
BILIRUB DIRECT SERPL-MCNC: >10 MG/DL — HIGH (ref 0–0.3)
BILIRUB INDIRECT FLD-MCNC: <3.6 MG/DL — HIGH (ref 0.2–1)
BILIRUB SERPL-MCNC: 13.6 MG/DL — HIGH (ref 0.4–2)
BUN SERPL-MCNC: 8 MG/DL — SIGNIFICANT CHANGE UP (ref 8–20)
CALCIUM SERPL-MCNC: 9.4 MG/DL — SIGNIFICANT CHANGE UP (ref 8.6–10.2)
CHLORIDE SERPL-SCNC: 95 MMOL/L — LOW (ref 98–107)
CO2 SERPL-SCNC: 27 MMOL/L — SIGNIFICANT CHANGE UP (ref 22–29)
CREAT SERPL-MCNC: 0.23 MG/DL — LOW (ref 0.5–1.3)
GLUCOSE BLDC GLUCOMTR-MCNC: 118 MG/DL — HIGH (ref 70–99)
GLUCOSE BLDC GLUCOMTR-MCNC: 169 MG/DL — HIGH (ref 70–99)
GLUCOSE BLDC GLUCOMTR-MCNC: 173 MG/DL — HIGH (ref 70–99)
GLUCOSE BLDC GLUCOMTR-MCNC: 197 MG/DL — HIGH (ref 70–99)
GLUCOSE SERPL-MCNC: 224 MG/DL — HIGH (ref 70–99)
HCT VFR BLD CALC: 34 % — LOW (ref 34.5–45)
HCT VFR BLD CALC: 36.3 % — SIGNIFICANT CHANGE UP (ref 34.5–45)
HGB BLD-MCNC: 12 G/DL — SIGNIFICANT CHANGE UP (ref 11.5–15.5)
HGB BLD-MCNC: 12.6 G/DL — SIGNIFICANT CHANGE UP (ref 11.5–15.5)
INR BLD: 1.39 RATIO — HIGH (ref 0.88–1.16)
MAGNESIUM SERPL-MCNC: 2.5 MG/DL — SIGNIFICANT CHANGE UP (ref 1.6–2.6)
MCHC RBC-ENTMCNC: 33 PG — SIGNIFICANT CHANGE UP (ref 27–34)
MCHC RBC-ENTMCNC: 33.4 PG — SIGNIFICANT CHANGE UP (ref 27–34)
MCHC RBC-ENTMCNC: 34.7 GM/DL — SIGNIFICANT CHANGE UP (ref 32–36)
MCHC RBC-ENTMCNC: 35.3 GM/DL — SIGNIFICANT CHANGE UP (ref 32–36)
MCV RBC AUTO: 94.7 FL — SIGNIFICANT CHANGE UP (ref 80–100)
MCV RBC AUTO: 95 FL — SIGNIFICANT CHANGE UP (ref 80–100)
PLATELET # BLD AUTO: 209 K/UL — SIGNIFICANT CHANGE UP (ref 150–400)
PLATELET # BLD AUTO: 260 K/UL — SIGNIFICANT CHANGE UP (ref 150–400)
POTASSIUM SERPL-MCNC: 4.2 MMOL/L — SIGNIFICANT CHANGE UP (ref 3.5–5.3)
POTASSIUM SERPL-SCNC: 4.2 MMOL/L — SIGNIFICANT CHANGE UP (ref 3.5–5.3)
PROT SERPL-MCNC: 6.4 G/DL — LOW (ref 6.6–8.7)
PROTHROM AB SERPL-ACNC: 15.9 SEC — HIGH (ref 10.6–13.6)
RBC # BLD: 3.59 M/UL — LOW (ref 3.8–5.2)
RBC # BLD: 3.82 M/UL — SIGNIFICANT CHANGE UP (ref 3.8–5.2)
RBC # FLD: 16.2 % — HIGH (ref 10.3–14.5)
RBC # FLD: 16.3 % — HIGH (ref 10.3–14.5)
SODIUM SERPL-SCNC: 134 MMOL/L — LOW (ref 135–145)
WBC # BLD: 12.71 K/UL — HIGH (ref 3.8–10.5)
WBC # BLD: 5.04 K/UL — SIGNIFICANT CHANGE UP (ref 3.8–10.5)
WBC # FLD AUTO: 12.71 K/UL — HIGH (ref 3.8–10.5)
WBC # FLD AUTO: 5.04 K/UL — SIGNIFICANT CHANGE UP (ref 3.8–10.5)

## 2020-09-12 PROCEDURE — 99233 SBSQ HOSP IP/OBS HIGH 50: CPT

## 2020-09-12 PROCEDURE — 12345: CPT | Mod: NC

## 2020-09-12 RX ORDER — HEPARIN SODIUM 5000 [USP'U]/ML
1200 INJECTION INTRAVENOUS; SUBCUTANEOUS
Qty: 25000 | Refills: 0 | Status: DISCONTINUED | OUTPATIENT
Start: 2020-09-12 | End: 2020-09-13

## 2020-09-12 RX ORDER — HEPARIN SODIUM 5000 [USP'U]/ML
3000 INJECTION INTRAVENOUS; SUBCUTANEOUS EVERY 6 HOURS
Refills: 0 | Status: DISCONTINUED | OUTPATIENT
Start: 2020-09-12 | End: 2020-09-13

## 2020-09-12 RX ORDER — HEPARIN SODIUM 5000 [USP'U]/ML
6500 INJECTION INTRAVENOUS; SUBCUTANEOUS EVERY 6 HOURS
Refills: 0 | Status: DISCONTINUED | OUTPATIENT
Start: 2020-09-12 | End: 2020-09-13

## 2020-09-12 RX ADMIN — ATENOLOL 50 MILLIGRAM(S): 25 TABLET ORAL at 18:11

## 2020-09-12 RX ADMIN — Medication 1: at 16:53

## 2020-09-12 RX ADMIN — Medication 20 MILLIGRAM(S): at 08:15

## 2020-09-12 RX ADMIN — SODIUM CHLORIDE 50 MILLILITER(S): 9 INJECTION INTRAMUSCULAR; INTRAVENOUS; SUBCUTANEOUS at 22:13

## 2020-09-12 RX ADMIN — HEPARIN SODIUM 900 UNIT(S)/HR: 5000 INJECTION INTRAVENOUS; SUBCUTANEOUS at 21:51

## 2020-09-12 RX ADMIN — HEPARIN SODIUM 1200 UNIT(S)/HR: 5000 INJECTION INTRAVENOUS; SUBCUTANEOUS at 10:18

## 2020-09-12 RX ADMIN — Medication 250 MILLIGRAM(S): at 06:00

## 2020-09-12 RX ADMIN — SPIRONOLACTONE 50 MILLIGRAM(S): 25 TABLET, FILM COATED ORAL at 18:12

## 2020-09-12 RX ADMIN — Medication 1: at 08:15

## 2020-09-12 RX ADMIN — PIPERACILLIN AND TAZOBACTAM 25 GRAM(S): 4; .5 INJECTION, POWDER, LYOPHILIZED, FOR SOLUTION INTRAVENOUS at 22:13

## 2020-09-12 RX ADMIN — Medication 250 MILLIGRAM(S): at 18:11

## 2020-09-12 RX ADMIN — Medication 20 MILLIGRAM(S): at 16:53

## 2020-09-12 RX ADMIN — SPIRONOLACTONE 50 MILLIGRAM(S): 25 TABLET, FILM COATED ORAL at 06:00

## 2020-09-12 RX ADMIN — ATENOLOL 50 MILLIGRAM(S): 25 TABLET ORAL at 06:00

## 2020-09-12 RX ADMIN — PIPERACILLIN AND TAZOBACTAM 25 GRAM(S): 4; .5 INJECTION, POWDER, LYOPHILIZED, FOR SOLUTION INTRAVENOUS at 14:14

## 2020-09-12 RX ADMIN — Medication 1: at 12:11

## 2020-09-12 RX ADMIN — PIPERACILLIN AND TAZOBACTAM 25 GRAM(S): 4; .5 INJECTION, POWDER, LYOPHILIZED, FOR SOLUTION INTRAVENOUS at 06:00

## 2020-09-12 RX ADMIN — HEPARIN SODIUM 0 UNIT(S)/HR: 5000 INJECTION INTRAVENOUS; SUBCUTANEOUS at 20:29

## 2020-09-12 RX ADMIN — Medication 20 MILLIGRAM(S): at 12:11

## 2020-09-12 RX ADMIN — Medication 20 MILLIGRAM(S): at 22:13

## 2020-09-12 RX ADMIN — Medication 5 MILLIGRAM(S): at 22:13

## 2020-09-12 RX ADMIN — LATANOPROST 1 DROP(S): 0.05 SOLUTION/ DROPS OPHTHALMIC; TOPICAL at 22:13

## 2020-09-12 NOTE — PROGRESS NOTE ADULT - SUBJECTIVE AND OBJECTIVE BOX
INTERVAL HPI/OVERNIGHT EVENTS:    CC: obstructive jaundice, diabetes mellitus, atrial fibrillation    Vital Signs Last 24 Hrs  T(C): 36.3 (12 Sep 2020 12:00), Max: 36.3 (11 Sep 2020 19:48)  T(F): 97.4 (12 Sep 2020 12:00), Max: 97.4 (11 Sep 2020 19:48)  HR: 62 (12 Sep 2020 14:00) (50 - 101)  BP: 93/46 (12 Sep 2020 14:00) (93/46 - 174/77)  BP(mean): 67 (12 Sep 2020 14:00) (67 - 115)  RR: 21 (12 Sep 2020 14:00) (13 - 34)  SpO2: 98% (12 Sep 2020 14:00) (91% - 100%)    PHYSICAL EXAM:    GENERAL: alert, sitting in chair, icteric  CHEST/LUNG: b/l air entry  HEART: regular  ABDOMEN: soft, bs+  EXTREMITIES:  1+ edema, non tender    MEDICATIONS  (STANDING):  ATENolol  Tablet 50 milliGRAM(s) Oral two times a day  dextrose 50% Injectable 12.5 Gram(s) IV Push once  dextrose 50% Injectable 25 Gram(s) IV Push once  dextrose 50% Injectable 25 Gram(s) IV Push once  dicyclomine 20 milliGRAM(s) Oral four times a day before meals  heparin  Infusion. 1200 Unit(s)/Hr (12 mL/Hr) IV Continuous <Continuous>  insulin lispro (HumaLOG) corrective regimen sliding scale   SubCutaneous three times a day before meals  insulin lispro (HumaLOG) corrective regimen sliding scale   SubCutaneous at bedtime  latanoprost 0.005% Ophthalmic Solution 1 Drop(s) Both EYES at bedtime  melatonin 5 milliGRAM(s) Oral at bedtime  piperacillin/tazobactam IVPB.. 3.375 Gram(s) IV Intermittent every 8 hours  potassium chloride    Tablet ER 40 milliEquivalent(s) Oral once  saccharomyces boulardii 250 milliGRAM(s) Oral two times a day  sodium chloride 0.9%. 1000 milliLiter(s) (50 mL/Hr) IV Continuous <Continuous>  spironolactone Oral Tab/Cap - Peds 50 milliGRAM(s) Oral two times a day    MEDICATIONS  (PRN):  bisacodyl 5 milliGRAM(s) Oral every 12 hours PRN Constipation  dextrose 40% Gel 15 Gram(s) Oral once PRN Blood Glucose LESS THAN 70 milliGRAM(s)/deciliter  glucagon  Injectable 1 milliGRAM(s) IntraMuscular once PRN Glucose LESS THAN 70 milligrams/deciliter  heparin   Injectable 6500 Unit(s) IV Push every 6 hours PRN For aPTT less than 40  heparin   Injectable 3000 Unit(s) IV Push every 6 hours PRN For aPTT between 40 - 57      Allergies    clindamycin (Rash)  penicillin (Rash)  sulfa drugs (Unknown)    Intolerances          LABS:                          12.6   5.04  )-----------( 209      ( 12 Sep 2020 04:03 )             36.3     09-12    134<L>  |  95<L>  |  8.0  ----------------------------<  224<H>  4.2   |  27.0  |  0.23<L>    Ca    9.4      12 Sep 2020 04:02  Mg     2.5     09-12    TPro  6.4<L>  /  Alb  2.7<L>  /  TBili  13.6<H>  /  DBili  >10.0<H>  /  AST  81<H>  /  ALT  85<H>  /  AlkPhos  367<H>  09-12    PT/INR - ( 12 Sep 2020 04:02 )   PT: 15.9 sec;   INR: 1.39 ratio         PTT - ( 12 Sep 2020 07:45 )  PTT:>200.0 sec      RADIOLOGY & ADDITIONAL TESTS:

## 2020-09-12 NOTE — PROVIDER CONTACT NOTE (CRITICAL VALUE NOTIFICATION) - SITUATION
Lab called with ptt critical of 141.8. According to full anticoagulation nomogram- infusion is to be stopped for 1 hr and restart with decrease rate -3. Infusion to be restarted w/rate of 9ml/hr at 21:30pm.

## 2020-09-12 NOTE — DOWNTIME INTERRUPTION NOTE - WHICH MANUAL FORMS INITIATED?
downtime started 9/11/20 2200 throughout 9/12/20 1000, see following flowsheets:   vitals signs, SCM eMar downtime report, ventilation flowsheet, Lab data, intake and output, neuro assessment flowsheet, vascular assessment flowsheet, patient safety flowsheet physical assessment flowsheet, pain management flowsheet,     No acute events or new orders overnight as per nurse downtime started 9/11/20 2200 throughout 9/12/20 1000, see following flowsheets:   vitals signs, SCM eMar downtime report, ventilation flowsheet, Lab data, intake and output, neuro assessment flowsheet, vascular assessment flowsheet, patient safety flowsheet physical assessment flowsheet, pain management flowsheet,     No acute events, see heparin medication record, rate changed at 9/12/20 @ 0800

## 2020-09-12 NOTE — PROVIDER CONTACT NOTE (CRITICAL VALUE NOTIFICATION) - ACTION/TREATMENT ORDERED:
As per heparin nomogram, hold heparin gtt for 1 hour and then restart at 1200 units/hr.
Heparin drip to be adjusted as needed.
New blood draw to be placed for 3-4am.
Stop drip for 1 hour, restart after at 9cc/h

## 2020-09-12 NOTE — PROGRESS NOTE ADULT - SUBJECTIVE AND OBJECTIVE BOX
Pt seen and examined f/u for obstructive jaundice due to pancreatic head mass  This morning she feels good with normal vital signs and no abdominal pain, nausea or vomiting. Bili down slightly, other LFTs the same. EUS/ERCP yesterday showed 1.8cm pancreatic head mass compressing the CBD and clogged stent which was replaced with an uncovered metallic stent. She had transient hypotension during the exam that responded to lyndsey.    REVIEW OF SYSTEMS:    CONSTITUTIONAL: No fever, weight loss, or fatigue  EYES: No eye pain, visual disturbances, or discharge  ENMT:  No difficulty hearing, tinnitus, vertigo; No sinus or throat pain  RESPIRATORY: No cough, wheezing, chills or hemoptysis; No shortness of breath  CARDIOVASCULAR: No chest pain, palpitations, dizziness, or leg swelling  GASTROINTESTINAL: No abdominal or epigastric pain. No nausea, vomiting, or hematemesis; No diarrhea or constipation. No melena or hematochezia.    MEDICATIONS:  MEDICATIONS  (STANDING):  ATENolol  Tablet 50 milliGRAM(s) Oral two times a day  dextrose 50% Injectable 12.5 Gram(s) IV Push once  dextrose 50% Injectable 25 Gram(s) IV Push once  dextrose 50% Injectable 25 Gram(s) IV Push once  dicyclomine 20 milliGRAM(s) Oral four times a day before meals  heparin  Infusion. 1200 Unit(s)/Hr (12 mL/Hr) IV Continuous <Continuous>  insulin lispro (HumaLOG) corrective regimen sliding scale   SubCutaneous three times a day before meals  insulin lispro (HumaLOG) corrective regimen sliding scale   SubCutaneous at bedtime  latanoprost 0.005% Ophthalmic Solution 1 Drop(s) Both EYES at bedtime  melatonin 5 milliGRAM(s) Oral at bedtime  piperacillin/tazobactam IVPB.. 3.375 Gram(s) IV Intermittent every 8 hours  potassium chloride    Tablet ER 40 milliEquivalent(s) Oral once  saccharomyces boulardii 250 milliGRAM(s) Oral two times a day  sodium chloride 0.9%. 1000 milliLiter(s) (50 mL/Hr) IV Continuous <Continuous>  spironolactone Oral Tab/Cap - Peds 50 milliGRAM(s) Oral two times a day    MEDICATIONS  (PRN):  bisacodyl 5 milliGRAM(s) Oral every 12 hours PRN Constipation  dextrose 40% Gel 15 Gram(s) Oral once PRN Blood Glucose LESS THAN 70 milliGRAM(s)/deciliter  glucagon  Injectable 1 milliGRAM(s) IntraMuscular once PRN Glucose LESS THAN 70 milligrams/deciliter  heparin   Injectable 6500 Unit(s) IV Push every 6 hours PRN For aPTT less than 40  heparin   Injectable 3000 Unit(s) IV Push every 6 hours PRN For aPTT between 40 - 57      Allergies    clindamycin (Rash)  penicillin (Rash)  sulfa drugs (Unknown)    Intolerances        Vital Signs Last 24 Hrs  T(C): 36.2 (12 Sep 2020 08:00), Max: 36.3 (11 Sep 2020 19:48)  T(F): 97.2 (12 Sep 2020 08:00), Max: 97.4 (11 Sep 2020 19:48)  HR: 54 (12 Sep 2020 10:00) (54 - 101)  BP: 98/55 (12 Sep 2020 10:00) (98/55 - 174/77)  BP(mean): 73 (12 Sep 2020 10:00) (73 - 115)  RR: 17 (12 Sep 2020 10:00) (13 - 34)  SpO2: 100% (12 Sep 2020 10:00) (91% - 100%)    09-11 @ 07:01  -  09-12 @ 07:00  --------------------------------------------------------  IN: 915 mL / OUT: 1000 mL / NET: -85 mL    09-12 @ 07:01 - 09-12 @ 11:32  --------------------------------------------------------  IN: 905 mL / OUT: 800 mL / NET: 105 mL        PHYSICAL EXAM:    General: Well developed; well nourished; in no acute distress, jaundiced  HEENT: MMM, conjunctiva and sclera clear  Gastrointestinal:Abdomen: Soft non-tender non-distended; Normal bowel sounds; No hepatosplenomegaly  Extremities: no cyanosis, clubbing or edema.  Skin: Warm and dry. No obvious rash    LABS:      CBC Full  -  ( 12 Sep 2020 04:03 )  WBC Count : 5.04 K/uL  RBC Count : 3.82 M/uL  Hemoglobin : 12.6 g/dL  Hematocrit : 36.3 %  Platelet Count - Automated : 209 K/uL  Mean Cell Volume : 95.0 fl  Mean Cell Hemoglobin : 33.0 pg  Mean Cell Hemoglobin Concentration : 34.7 gm/dL  Auto Neutrophil # : x  Auto Lymphocyte # : x  Auto Monocyte # : x  Auto Eosinophil # : x  Auto Basophil # : x  Auto Neutrophil % : x  Auto Lymphocyte % : x  Auto Monocyte % : x  Auto Eosinophil % : x  Auto Basophil % : x    09-12    134<L>  |  95<L>  |  8.0  ----------------------------<  224<H>  4.2   |  27.0  |  0.23<L>    Ca    9.4      12 Sep 2020 04:02  Mg     2.5     09-12    TPro  6.4<L>  /  Alb  2.7<L>  /  TBili  13.6<H>  /  DBili  >10.0<H>  /  AST  81<H>  /  ALT  85<H>  /  AlkPhos  367<H>  09-12    PT/INR - ( 12 Sep 2020 04:02 )   PT: 15.9 sec;   INR: 1.39 ratio         PTT - ( 12 Sep 2020 07:45 )  PTT:>200.0 sec                  RADIOLOGY & ADDITIONAL STUDIES (The following images were personally reviewed)

## 2020-09-13 LAB
ALBUMIN SERPL ELPH-MCNC: 2.5 G/DL — LOW (ref 3.3–5.2)
ALP SERPL-CCNC: 304 U/L — HIGH (ref 40–120)
ALT FLD-CCNC: 69 U/L — HIGH
APTT BLD: 66.6 SEC — HIGH (ref 27.5–35.5)
APTT BLD: 89.9 SEC — HIGH (ref 27.5–35.5)
AST SERPL-CCNC: 60 U/L — HIGH
BILIRUB DIRECT SERPL-MCNC: 5.6 MG/DL — HIGH (ref 0–0.3)
BILIRUB INDIRECT FLD-MCNC: 2.2 MG/DL — HIGH (ref 0.2–1)
BILIRUB SERPL-MCNC: 7.8 MG/DL — HIGH (ref 0.4–2)
GLUCOSE BLDC GLUCOMTR-MCNC: 130 MG/DL — HIGH (ref 70–99)
GLUCOSE BLDC GLUCOMTR-MCNC: 135 MG/DL — HIGH (ref 70–99)
GLUCOSE BLDC GLUCOMTR-MCNC: 145 MG/DL — HIGH (ref 70–99)
GLUCOSE BLDC GLUCOMTR-MCNC: 146 MG/DL — HIGH (ref 70–99)
HCT VFR BLD CALC: 34.8 % — SIGNIFICANT CHANGE UP (ref 34.5–45)
HGB BLD-MCNC: 12 G/DL — SIGNIFICANT CHANGE UP (ref 11.5–15.5)
MCHC RBC-ENTMCNC: 33.1 PG — SIGNIFICANT CHANGE UP (ref 27–34)
MCHC RBC-ENTMCNC: 34.5 GM/DL — SIGNIFICANT CHANGE UP (ref 32–36)
MCV RBC AUTO: 95.9 FL — SIGNIFICANT CHANGE UP (ref 80–100)
PLATELET # BLD AUTO: 246 K/UL — SIGNIFICANT CHANGE UP (ref 150–400)
PROT SERPL-MCNC: 6 G/DL — LOW (ref 6.6–8.7)
RBC # BLD: 3.63 M/UL — LOW (ref 3.8–5.2)
RBC # FLD: 16.6 % — HIGH (ref 10.3–14.5)
WBC # BLD: 10.28 K/UL — SIGNIFICANT CHANGE UP (ref 3.8–10.5)
WBC # FLD AUTO: 10.28 K/UL — SIGNIFICANT CHANGE UP (ref 3.8–10.5)

## 2020-09-13 PROCEDURE — 99233 SBSQ HOSP IP/OBS HIGH 50: CPT

## 2020-09-13 RX ORDER — RIVAROXABAN 15 MG-20MG
20 KIT ORAL
Refills: 0 | Status: DISCONTINUED | OUTPATIENT
Start: 2020-09-13 | End: 2020-09-15

## 2020-09-13 RX ADMIN — Medication 250 MILLIGRAM(S): at 05:34

## 2020-09-13 RX ADMIN — SPIRONOLACTONE 50 MILLIGRAM(S): 25 TABLET, FILM COATED ORAL at 17:31

## 2020-09-13 RX ADMIN — Medication 20 MILLIGRAM(S): at 21:23

## 2020-09-13 RX ADMIN — SPIRONOLACTONE 50 MILLIGRAM(S): 25 TABLET, FILM COATED ORAL at 05:34

## 2020-09-13 RX ADMIN — PIPERACILLIN AND TAZOBACTAM 25 GRAM(S): 4; .5 INJECTION, POWDER, LYOPHILIZED, FOR SOLUTION INTRAVENOUS at 05:34

## 2020-09-13 RX ADMIN — ATENOLOL 50 MILLIGRAM(S): 25 TABLET ORAL at 17:31

## 2020-09-13 RX ADMIN — LATANOPROST 1 DROP(S): 0.05 SOLUTION/ DROPS OPHTHALMIC; TOPICAL at 21:23

## 2020-09-13 RX ADMIN — PIPERACILLIN AND TAZOBACTAM 25 GRAM(S): 4; .5 INJECTION, POWDER, LYOPHILIZED, FOR SOLUTION INTRAVENOUS at 14:59

## 2020-09-13 RX ADMIN — Medication 250 MILLIGRAM(S): at 17:30

## 2020-09-13 RX ADMIN — Medication 5 MILLIGRAM(S): at 21:26

## 2020-09-13 RX ADMIN — PIPERACILLIN AND TAZOBACTAM 25 GRAM(S): 4; .5 INJECTION, POWDER, LYOPHILIZED, FOR SOLUTION INTRAVENOUS at 21:22

## 2020-09-13 RX ADMIN — SODIUM CHLORIDE 50 MILLILITER(S): 9 INJECTION INTRAMUSCULAR; INTRAVENOUS; SUBCUTANEOUS at 05:40

## 2020-09-13 RX ADMIN — Medication 20 MILLIGRAM(S): at 08:37

## 2020-09-13 RX ADMIN — HEPARIN SODIUM 900 UNIT(S)/HR: 5000 INJECTION INTRAVENOUS; SUBCUTANEOUS at 03:56

## 2020-09-13 RX ADMIN — Medication 20 MILLIGRAM(S): at 17:30

## 2020-09-13 RX ADMIN — RIVAROXABAN 20 MILLIGRAM(S): KIT at 17:30

## 2020-09-13 RX ADMIN — Medication 20 MILLIGRAM(S): at 11:24

## 2020-09-13 NOTE — PROGRESS NOTE ADULT - ATTENDING COMMENTS
Discussed with patient and RN.  PT evaluation.  Discharge in 24-48 hrs. Discussed with patient and RN.  PT evaluation.  Discharge in 24-48 hrs.  updated daughter.

## 2020-09-13 NOTE — PROGRESS NOTE ADULT - SUBJECTIVE AND OBJECTIVE BOX
Pt seen and examined f/u for obstructive jaundice from pancreatic head mass    This AM she feels very well and eating well. No abdominal pain, nausea or vomiting. Bili down to 7.8 from 13. H/H stable.    REVIEW OF SYSTEMS:    CONSTITUTIONAL: No fever, weight loss, or fatigue  EYES: No eye pain, visual disturbances, or discharge  ENMT:  No difficulty hearing, tinnitus, vertigo; No sinus or throat pain  RESPIRATORY: No cough, wheezing, chills or hemoptysis; No shortness of breath  CARDIOVASCULAR: No chest pain, palpitations, dizziness, or leg swelling  GASTROINTESTINAL: No abdominal or epigastric pain. No nausea, vomiting, or hematemesis; No diarrhea or constipation. No melena or hematochezia.    MEDICATIONS:  MEDICATIONS  (STANDING):  ATENolol  Tablet 50 milliGRAM(s) Oral two times a day  dextrose 50% Injectable 12.5 Gram(s) IV Push once  dextrose 50% Injectable 25 Gram(s) IV Push once  dextrose 50% Injectable 25 Gram(s) IV Push once  dicyclomine 20 milliGRAM(s) Oral four times a day before meals  heparin  Infusion. 1200 Unit(s)/Hr (12 mL/Hr) IV Continuous <Continuous>  insulin lispro (HumaLOG) corrective regimen sliding scale   SubCutaneous three times a day before meals  insulin lispro (HumaLOG) corrective regimen sliding scale   SubCutaneous at bedtime  latanoprost 0.005% Ophthalmic Solution 1 Drop(s) Both EYES at bedtime  melatonin 5 milliGRAM(s) Oral at bedtime  piperacillin/tazobactam IVPB.. 3.375 Gram(s) IV Intermittent every 8 hours  potassium chloride    Tablet ER 40 milliEquivalent(s) Oral once  saccharomyces boulardii 250 milliGRAM(s) Oral two times a day  sodium chloride 0.9%. 1000 milliLiter(s) (50 mL/Hr) IV Continuous <Continuous>  spironolactone Oral Tab/Cap - Peds 50 milliGRAM(s) Oral two times a day    MEDICATIONS  (PRN):  bisacodyl 5 milliGRAM(s) Oral every 12 hours PRN Constipation  dextrose 40% Gel 15 Gram(s) Oral once PRN Blood Glucose LESS THAN 70 milliGRAM(s)/deciliter  glucagon  Injectable 1 milliGRAM(s) IntraMuscular once PRN Glucose LESS THAN 70 milligrams/deciliter  heparin   Injectable 6500 Unit(s) IV Push every 6 hours PRN For aPTT less than 40  heparin   Injectable 3000 Unit(s) IV Push every 6 hours PRN For aPTT between 40 - 57      Allergies    clindamycin (Rash)  penicillin (Rash)  sulfa drugs (Unknown)    Intolerances        Vital Signs Last 24 Hrs  T(C): 36.3 (13 Sep 2020 05:06), Max: 36.6 (12 Sep 2020 17:48)  T(F): 97.4 (13 Sep 2020 05:06), Max: 97.9 (12 Sep 2020 17:48)  HR: 73 (13 Sep 2020 05:32) (50 - 73)  BP: 98/60 (13 Sep 2020 05:32) (93/46 - 119/58)  BP(mean): 84 (12 Sep 2020 16:00) (67 - 84)  RR: 18 (13 Sep 2020 05:06) (16 - 25)  SpO2: 98% (12 Sep 2020 21:44) (96% - 100%)    09-12 @ 07:01  -  09-13 @ 07:00  --------------------------------------------------------  IN: 2443 mL / OUT: 2100 mL / NET: 343 mL        PHYSICAL EXAM:    General: Well developed; well nourished; in no acute distress, jaundiced  HEENT: MMM, conjunctiva and scleral icterus  Gastrointestinal:Abdomen: Soft non-tender non-distended; Normal bowel sounds; No hepatosplenomegaly  Extremities: no cyanosis, clubbing or edema.  Skin: Warm and dry. No obvious rash    LABS:      CBC Full  -  ( 13 Sep 2020 03:02 )  WBC Count : 10.28 K/uL  RBC Count : 3.63 M/uL  Hemoglobin : 12.0 g/dL  Hematocrit : 34.8 %  Platelet Count - Automated : 246 K/uL  Mean Cell Volume : 95.9 fl  Mean Cell Hemoglobin : 33.1 pg  Mean Cell Hemoglobin Concentration : 34.5 gm/dL  Auto Neutrophil # : x  Auto Lymphocyte # : x  Auto Monocyte # : x  Auto Eosinophil # : x  Auto Basophil # : x  Auto Neutrophil % : x  Auto Lymphocyte % : x  Auto Monocyte % : x  Auto Eosinophil % : x  Auto Basophil % : x    09-12    134<L>  |  95<L>  |  8.0  ----------------------------<  224<H>  4.2   |  27.0  |  0.23<L>    Ca    9.4      12 Sep 2020 04:02  Mg     2.5     09-12    TPro  6.0<L>  /  Alb  2.5<L>  /  TBili  7.8<H>  /  DBili  5.6<H>  /  AST  60<H>  /  ALT  69<H>  /  AlkPhos  304<H>  09-13    PT/INR - ( 12 Sep 2020 04:02 )   PT: 15.9 sec;   INR: 1.39 ratio         PTT - ( 13 Sep 2020 03:02 )  PTT:89.9 sec

## 2020-09-13 NOTE — PROGRESS NOTE ADULT - SUBJECTIVE AND OBJECTIVE BOX
INTERVAL HPI/OVERNIGHT EVENTS:    CC:  obstructive jaundice, diabetes mellitus, atrial fibrillation    No overnight events, feels better. No abdominal pain, tolerating diet    Vital Signs Last 24 Hrs  T(C): 36.3 (13 Sep 2020 05:06), Max: 36.6 (12 Sep 2020 17:48)  T(F): 97.4 (13 Sep 2020 05:06), Max: 97.9 (12 Sep 2020 17:48)  HR: 73 (13 Sep 2020 05:32) (50 - 73)  BP: 98/60 (13 Sep 2020 05:32) (93/46 - 119/58)  BP(mean): 84 (12 Sep 2020 16:00) (67 - 84)  RR: 18 (13 Sep 2020 05:06) (16 - 25)  SpO2: 98% (12 Sep 2020 21:44) (96% - 100%)    PHYSICAL EXAM:    GENERAL: alert, not in distress, mild icterus  CHEST/LUNG: b/l air entry  HEART: regular  ABDOMEN: soft, bs+, non tender  EXTREMITIES:  no edema, tenderness    MEDICATIONS  (STANDING):  ATENolol  Tablet 50 milliGRAM(s) Oral two times a day  dextrose 50% Injectable 12.5 Gram(s) IV Push once  dextrose 50% Injectable 25 Gram(s) IV Push once  dextrose 50% Injectable 25 Gram(s) IV Push once  dicyclomine 20 milliGRAM(s) Oral four times a day before meals  insulin lispro (HumaLOG) corrective regimen sliding scale   SubCutaneous three times a day before meals  insulin lispro (HumaLOG) corrective regimen sliding scale   SubCutaneous at bedtime  latanoprost 0.005% Ophthalmic Solution 1 Drop(s) Both EYES at bedtime  melatonin 5 milliGRAM(s) Oral at bedtime  piperacillin/tazobactam IVPB.. 3.375 Gram(s) IV Intermittent every 8 hours  potassium chloride    Tablet ER 40 milliEquivalent(s) Oral once  rivaroxaban 20 milliGRAM(s) Oral with dinner  saccharomyces boulardii 250 milliGRAM(s) Oral two times a day  spironolactone Oral Tab/Cap - Peds 50 milliGRAM(s) Oral two times a day    MEDICATIONS  (PRN):  bisacodyl 5 milliGRAM(s) Oral every 12 hours PRN Constipation  dextrose 40% Gel 15 Gram(s) Oral once PRN Blood Glucose LESS THAN 70 milliGRAM(s)/deciliter  glucagon  Injectable 1 milliGRAM(s) IntraMuscular once PRN Glucose LESS THAN 70 milligrams/deciliter      Allergies    clindamycin (Rash)  penicillin (Rash)  sulfa drugs (Unknown)    Intolerances          LABS:                          12.0   10.28 )-----------( 246      ( 13 Sep 2020 03:02 )             34.8     09-12    134<L>  |  95<L>  |  8.0  ----------------------------<  224<H>  4.2   |  27.0  |  0.23<L>    Ca    9.4      12 Sep 2020 04:02  Mg     2.5     09-12    TPro  6.0<L>  /  Alb  2.5<L>  /  TBili  7.8<H>  /  DBili  5.6<H>  /  AST  60<H>  /  ALT  69<H>  /  AlkPhos  304<H>  09-13    PT/INR - ( 12 Sep 2020 04:02 )   PT: 15.9 sec;   INR: 1.39 ratio         PTT - ( 13 Sep 2020 03:02 )  PTT:89.9 sec      RADIOLOGY & ADDITIONAL TESTS:

## 2020-09-14 ENCOUNTER — TRANSCRIPTION ENCOUNTER (OUTPATIENT)
Age: 85
End: 2020-09-14

## 2020-09-14 LAB
ALBUMIN SERPL ELPH-MCNC: 2.6 G/DL — LOW (ref 3.3–5.2)
ALP SERPL-CCNC: 258 U/L — HIGH (ref 40–120)
ALT FLD-CCNC: 61 U/L — HIGH
ANION GAP SERPL CALC-SCNC: 10 MMOL/L — SIGNIFICANT CHANGE UP (ref 5–17)
AST SERPL-CCNC: 52 U/L — HIGH
BILIRUB DIRECT SERPL-MCNC: 3.8 MG/DL — HIGH (ref 0–0.3)
BILIRUB INDIRECT FLD-MCNC: 1.9 MG/DL — HIGH (ref 0.2–1)
BILIRUB SERPL-MCNC: 5.7 MG/DL — HIGH (ref 0.4–2)
BUN SERPL-MCNC: 20 MG/DL — SIGNIFICANT CHANGE UP (ref 8–20)
CALCIUM SERPL-MCNC: 8.2 MG/DL — LOW (ref 8.6–10.2)
CHLORIDE SERPL-SCNC: 103 MMOL/L — SIGNIFICANT CHANGE UP (ref 98–107)
CO2 SERPL-SCNC: 23 MMOL/L — SIGNIFICANT CHANGE UP (ref 22–29)
CREAT SERPL-MCNC: 0.52 MG/DL — SIGNIFICANT CHANGE UP (ref 0.5–1.3)
GLUCOSE BLDC GLUCOMTR-MCNC: 115 MG/DL — HIGH (ref 70–99)
GLUCOSE BLDC GLUCOMTR-MCNC: 133 MG/DL — HIGH (ref 70–99)
GLUCOSE BLDC GLUCOMTR-MCNC: 135 MG/DL — HIGH (ref 70–99)
GLUCOSE BLDC GLUCOMTR-MCNC: 136 MG/DL — HIGH (ref 70–99)
GLUCOSE SERPL-MCNC: 129 MG/DL — HIGH (ref 70–99)
HCT VFR BLD CALC: 34.7 % — SIGNIFICANT CHANGE UP (ref 34.5–45)
HGB BLD-MCNC: 11.7 G/DL — SIGNIFICANT CHANGE UP (ref 11.5–15.5)
MCHC RBC-ENTMCNC: 33.3 PG — SIGNIFICANT CHANGE UP (ref 27–34)
MCHC RBC-ENTMCNC: 33.7 GM/DL — SIGNIFICANT CHANGE UP (ref 32–36)
MCV RBC AUTO: 98.9 FL — SIGNIFICANT CHANGE UP (ref 80–100)
PLATELET # BLD AUTO: 245 K/UL — SIGNIFICANT CHANGE UP (ref 150–400)
POTASSIUM SERPL-MCNC: 3.6 MMOL/L — SIGNIFICANT CHANGE UP (ref 3.5–5.3)
POTASSIUM SERPL-SCNC: 3.6 MMOL/L — SIGNIFICANT CHANGE UP (ref 3.5–5.3)
PROT SERPL-MCNC: 6 G/DL — LOW (ref 6.6–8.7)
RBC # BLD: 3.51 M/UL — LOW (ref 3.8–5.2)
RBC # FLD: 17.2 % — HIGH (ref 10.3–14.5)
SODIUM SERPL-SCNC: 136 MMOL/L — SIGNIFICANT CHANGE UP (ref 135–145)
WBC # BLD: 5.67 K/UL — SIGNIFICANT CHANGE UP (ref 3.8–10.5)
WBC # FLD AUTO: 5.67 K/UL — SIGNIFICANT CHANGE UP (ref 3.8–10.5)

## 2020-09-14 PROCEDURE — 99232 SBSQ HOSP IP/OBS MODERATE 35: CPT

## 2020-09-14 RX ADMIN — LATANOPROST 1 DROP(S): 0.05 SOLUTION/ DROPS OPHTHALMIC; TOPICAL at 21:52

## 2020-09-14 RX ADMIN — SPIRONOLACTONE 50 MILLIGRAM(S): 25 TABLET, FILM COATED ORAL at 05:09

## 2020-09-14 RX ADMIN — ATENOLOL 50 MILLIGRAM(S): 25 TABLET ORAL at 17:20

## 2020-09-14 RX ADMIN — PIPERACILLIN AND TAZOBACTAM 25 GRAM(S): 4; .5 INJECTION, POWDER, LYOPHILIZED, FOR SOLUTION INTRAVENOUS at 21:52

## 2020-09-14 RX ADMIN — Medication 250 MILLIGRAM(S): at 17:19

## 2020-09-14 RX ADMIN — Medication 0: at 16:35

## 2020-09-14 RX ADMIN — Medication 20 MILLIGRAM(S): at 16:32

## 2020-09-14 RX ADMIN — RIVAROXABAN 20 MILLIGRAM(S): KIT at 16:32

## 2020-09-14 RX ADMIN — Medication 20 MILLIGRAM(S): at 21:52

## 2020-09-14 RX ADMIN — PIPERACILLIN AND TAZOBACTAM 25 GRAM(S): 4; .5 INJECTION, POWDER, LYOPHILIZED, FOR SOLUTION INTRAVENOUS at 14:14

## 2020-09-14 RX ADMIN — ATENOLOL 50 MILLIGRAM(S): 25 TABLET ORAL at 05:08

## 2020-09-14 RX ADMIN — Medication 250 MILLIGRAM(S): at 05:08

## 2020-09-14 RX ADMIN — Medication 20 MILLIGRAM(S): at 06:35

## 2020-09-14 RX ADMIN — Medication 5 MILLIGRAM(S): at 21:52

## 2020-09-14 RX ADMIN — Medication 20 MILLIGRAM(S): at 12:03

## 2020-09-14 RX ADMIN — Medication 0: at 08:09

## 2020-09-14 RX ADMIN — Medication 0: at 12:02

## 2020-09-14 RX ADMIN — PIPERACILLIN AND TAZOBACTAM 25 GRAM(S): 4; .5 INJECTION, POWDER, LYOPHILIZED, FOR SOLUTION INTRAVENOUS at 05:08

## 2020-09-14 RX ADMIN — SPIRONOLACTONE 50 MILLIGRAM(S): 25 TABLET, FILM COATED ORAL at 17:20

## 2020-09-14 NOTE — PHYSICAL THERAPY INITIAL EVALUATION ADULT - PERTINENT HX OF CURRENT PROBLEM, REHAB EVAL
pt presents to [facility] due to jaundice and weakness, ERCP c CBD c stent removal and replacement 9/11/20

## 2020-09-14 NOTE — DISCHARGE NOTE PROVIDER - PROVIDER TOKENS
PROVIDER:[TOKEN:[[id number]]] PROVIDER:[TOKEN:[[id number]]],PROVIDER:[TOKEN:[[id number]]]

## 2020-09-14 NOTE — DISCHARGE NOTE PROVIDER - NSDCMRMEDTOKEN_GEN_ALL_CORE_FT
atenolol 50 mg oral tablet: 1 tab(s) orally 2 times a day  dicyclomine 20 mg oral tablet: 1 tab(s) orally 4 times a day  spironolactone 50 mg oral tablet: 1 tab(s) orally 2 times a day  travoprost 0.004% ophthalmic solution: 1 drop(s) to each affected eye once a day (in the evening)  Xarelto 20 mg oral tablet: 1 tab(s) orally once a day (in the evening)   .: Wheelchair with cushions  atenolol 50 mg oral tablet: 1 tab(s) orally 2 times a day  bisacodyl 5 mg oral delayed release tablet: 1 tab(s) orally every 12 hours, As needed, Constipation  Klor-Con 10 oral tablet, extended release: 1 tab(s) orally once a day   spironolactone 50 mg oral tablet: 1 tab(s) orally 2 times a day  travoprost 0.004% ophthalmic solution: 1 drop(s) to each affected eye once a day (in the evening)  Xarelto 20 mg oral tablet: 1 tab(s) orally once a day (in the evening)

## 2020-09-14 NOTE — PHYSICAL THERAPY INITIAL EVALUATION ADULT - ADDITIONAL COMMENTS
owns and uses a RW, plans to live with sister, 2 steps 1 rail to enter home, no stairs within home, sister works during day, was at a subacute rehab prior to arrival

## 2020-09-14 NOTE — DISCHARGE NOTE PROVIDER - CARE PROVIDER_API CALL
Cortes Mcdaniel  GASTROENTEROLOGY  [address] [place] [address]  Phone: [phone]  Fax: [phone]  Follow Up Time:    Cortes Mcdaniel  GASTROENTEROLOGY  [address] [facility], [address] [place] [address]  Phone: [phone]  Fax: [phone]  Follow Up Time:     Tawanda Roth  [id number] [place] [address]  Phone: [phone])-  Fax: [phone])-  Follow Up Time:

## 2020-09-14 NOTE — DISCHARGE NOTE PROVIDER - NSDCCPCAREPLAN_GEN_ALL_CORE_FT
PRINCIPAL DISCHARGE DIAGNOSIS  Diagnosis: Biliary obstruction  Assessment and Plan of Treatment: Follow up with GI, s/p CBD stent placement.      SECONDARY DISCHARGE DIAGNOSES  Diagnosis: Hypertension  Assessment and Plan of Treatment: Continue medications.    Diagnosis: Atrial fibrillation  Assessment and Plan of Treatment: Continue Xarelto

## 2020-09-14 NOTE — DISCHARGE NOTE PROVIDER - NSDCFUADDINST_GEN_ALL_CORE_FT
Continue medications as instructed, follow up with PMD and GI. Return to ED for worsening complaints.

## 2020-09-14 NOTE — PROGRESS NOTE ADULT - ATTENDING COMMENTS
Discussed with patient. She does not want to return to [facility], would like to go to her sister's house with home PT.   Discharge planning.

## 2020-09-14 NOTE — DISCHARGE NOTE PROVIDER - HOSPITAL COURSE
89 yr female with chronic atrial fibrillation on Xarelto, CHF, hypertension, glaucoma initially admitted to [place] for painless jaundice and weakness. Imaging done at the time revealed dilated pancreatic duct, CBD and possible stricture in proximal pancreatic duct by ampulla. She underwent ERCP with CBD stent placement at [place], post procedure developed pancreatitis. She was subsequently discharged to rehab but was readmitted to [facility] with painless jaundice. She was transferred to [place] for EGD/EUS/ERCP. She underwent procedure on 9/11, EUS SHOWE18 mm x18 mm hypoechoic pancreatic head mass. ERCP showed ERCP: Occluded bile duct stent and removed with snare. Then 10 mm x 60 mm uncovered metal bile duct stent placed with drainage of large amount of thick bile.   89 yr female with chronic atrial fibrillation on Xarelto, CHF, hypertension, glaucoma initially admitted to [place] for painless jaundice and weakness. Imaging done at the time revealed dilated pancreatic duct, CBD and possible stricture in proximal pancreatic duct by ampulla. She underwent ERCP with CBD stent placement at [place], post procedure developed pancreatitis. She was subsequently discharged to rehab but was readmitted to [place] with painless jaundice. She was transferred to [place] for EGD/EUS/ERCP. She underwent procedure on 9/11, EUS SHOWE18 mm x18 mm hypoechoic pancreatic head mass. ERCP showed ERCP: Occluded bile duct stent and removed with snare. Then 10 mm x 60 mm uncovered metal bile duct stent placed with drainage of large amount of thick bile.  She was evaluated by PT, stable for discharge home with home PT.     Spent > 35 mins in discharge plan and documentation.

## 2020-09-14 NOTE — PROGRESS NOTE ADULT - SUBJECTIVE AND OBJECTIVE BOX
INTERVAL HPI/OVERNIGHT EVENTS:    CC: obstructive jaundice s/p CBD stent placement, diabetes mellitus, atrial fibrillation        No overnight events, feels better, wishes to go to her sister's house, not to [facility].    Vital Signs Last 24 Hrs  T(C): 36.8 (14 Sep 2020 10:44), Max: 36.8 (14 Sep 2020 10:44)  T(F): 98.2 (14 Sep 2020 10:44), Max: 98.2 (14 Sep 2020 10:44)  HR: 73 (14 Sep 2020 10:44) (58 - 73)  BP: 112/73 (14 Sep 2020 10:44) (111/64 - 125/66)  BP(mean): --  RR: 18 (14 Sep 2020 10:44) (17 - 18)  SpO2: 98% (14 Sep 2020 10:44) (97% - 98%)    PHYSICAL EXAM:    GENERAL: alert, not in distress, icterus improved  CHEST/LUNG: b/l air entry  HEART: regular  ABDOMEN: soft, bs+  EXTREMITIES:  no edema, tenderness    MEDICATIONS  (STANDING):  ATENolol  Tablet 50 milliGRAM(s) Oral two times a day  dextrose 50% Injectable 12.5 Gram(s) IV Push once  dextrose 50% Injectable 25 Gram(s) IV Push once  dextrose 50% Injectable 25 Gram(s) IV Push once  dicyclomine 20 milliGRAM(s) Oral four times a day before meals  insulin lispro (HumaLOG) corrective regimen sliding scale   SubCutaneous three times a day before meals  insulin lispro (HumaLOG) corrective regimen sliding scale   SubCutaneous at bedtime  latanoprost 0.005% Ophthalmic Solution 1 Drop(s) Both EYES at bedtime  melatonin 5 milliGRAM(s) Oral at bedtime  piperacillin/tazobactam IVPB.. 3.375 Gram(s) IV Intermittent every 8 hours  potassium chloride    Tablet ER 40 milliEquivalent(s) Oral once  rivaroxaban 20 milliGRAM(s) Oral with dinner  saccharomyces boulardii 250 milliGRAM(s) Oral two times a day  spironolactone Oral Tab/Cap - Peds 50 milliGRAM(s) Oral two times a day    MEDICATIONS  (PRN):  bisacodyl 5 milliGRAM(s) Oral every 12 hours PRN Constipation  dextrose 40% Gel 15 Gram(s) Oral once PRN Blood Glucose LESS THAN 70 milliGRAM(s)/deciliter  glucagon  Injectable 1 milliGRAM(s) IntraMuscular once PRN Glucose LESS THAN 70 milligrams/deciliter      Allergies    clindamycin (Rash)  penicillin (Rash)  sulfa drugs (Unknown)    Intolerances          LABS:                          11.7   5.67  )-----------( 245      ( 14 Sep 2020 05:49 )             34.7     09-14    136  |  103  |  20.0  ----------------------------<  129<H>  3.6   |  23.0  |  0.52    Ca    8.2<L>      14 Sep 2020 05:49    TPro  6.0<L>  /  Alb  2.6<L>  /  TBili  5.7<H>  /  DBili  3.8<H>  /  AST  52<H>  /  ALT  61<H>  /  AlkPhos  258<H>  09-14    PTT - ( 13 Sep 2020 11:30 )  PTT:66.6 sec      RADIOLOGY & ADDITIONAL TESTS:

## 2020-09-14 NOTE — DISCHARGE NOTE PROVIDER - CARE PROVIDERS DIRECT ADDRESSES
,[email or web address]_[email or web address]

## 2020-09-14 NOTE — DISCHARGE NOTE NURSING/CASE MANAGEMENT/SOCIAL WORK - PATIENT PORTAL LINK FT
You can access the FollowMyHealth Patient Portal offered by [facility] by registering at the following website: [email or web address]. By joining [employer]’s FollowMyHealth portal, you will also be able to view your health information using other applications (apps) compatible with our system.

## 2020-09-14 NOTE — PROGRESS NOTE ADULT - SUBJECTIVE AND OBJECTIVE BOX
INTERVAL HPI/OVERNIGHT EVENTS:FU after EUS/ERCP. No other complaints. Cytology is pending. LFts have improved.     MEDICATIONS  (STANDING):  ATENolol  Tablet 50 milliGRAM(s) Oral two times a day  dextrose 50% Injectable 12.5 Gram(s) IV Push once  dextrose 50% Injectable 25 Gram(s) IV Push once  dextrose 50% Injectable 25 Gram(s) IV Push once  dicyclomine 20 milliGRAM(s) Oral four times a day before meals  insulin lispro (HumaLOG) corrective regimen sliding scale   SubCutaneous three times a day before meals  insulin lispro (HumaLOG) corrective regimen sliding scale   SubCutaneous at bedtime  latanoprost 0.005% Ophthalmic Solution 1 Drop(s) Both EYES at bedtime  melatonin 5 milliGRAM(s) Oral at bedtime  piperacillin/tazobactam IVPB.. 3.375 Gram(s) IV Intermittent every 8 hours  potassium chloride    Tablet ER 40 milliEquivalent(s) Oral once  rivaroxaban 20 milliGRAM(s) Oral with dinner  saccharomyces boulardii 250 milliGRAM(s) Oral two times a day  spironolactone Oral Tab/Cap - Peds 50 milliGRAM(s) Oral two times a day    MEDICATIONS  (PRN):  bisacodyl 5 milliGRAM(s) Oral every 12 hours PRN Constipation  dextrose 40% Gel 15 Gram(s) Oral once PRN Blood Glucose LESS THAN 70 milliGRAM(s)/deciliter  glucagon  Injectable 1 milliGRAM(s) IntraMuscular once PRN Glucose LESS THAN 70 milligrams/deciliter      Allergies    clindamycin (Rash)  penicillin (Rash)  sulfa drugs (Unknown)    Intolerances        Vital Signs Last 24 Hrs  T(C): 36.6 (14 Sep 2020 04:14), Max: 36.7 (13 Sep 2020 20:52)  T(F): 97.8 (14 Sep 2020 04:14), Max: 98 (13 Sep 2020 20:52)  HR: 60 (14 Sep 2020 04:14) (58 - 69)  BP: 112/74 (14 Sep 2020 04:14) (111/64 - 125/66)  BP(mean): --  RR: 17 (14 Sep 2020 04:14) (17 - 18)  SpO2: 98% (14 Sep 2020 04:14) (97% - 98%)    LABS:                        11.7   5.67  )-----------( 245      ( 14 Sep 2020 05:49 )             34.7     09-14    136  |  103  |  20.0  ----------------------------<  129<H>  3.6   |  23.0  |  0.52    Ca    8.2<L>      14 Sep 2020 05:49    TPro  6.0<L>  /  Alb  2.6<L>  /  TBili  5.7<H>  /  DBili  3.8<H>  /  AST  52<H>  /  ALT  61<H>  /  AlkPhos  258<H>  09-14    PTT - ( 13 Sep 2020 11:30 )  PTT:66.6 sec      RADIOLOGY & ADDITIONAL TESTS:

## 2020-09-15 VITALS
TEMPERATURE: 99 F | HEART RATE: 60 BPM | DIASTOLIC BLOOD PRESSURE: 73 MMHG | SYSTOLIC BLOOD PRESSURE: 137 MMHG | OXYGEN SATURATION: 99 % | RESPIRATION RATE: 18 BRPM

## 2020-09-15 LAB
GLUCOSE BLDC GLUCOMTR-MCNC: 142 MG/DL — HIGH (ref 70–99)
GLUCOSE BLDC GLUCOMTR-MCNC: 159 MG/DL — HIGH (ref 70–99)
HCT VFR BLD CALC: 35 % — SIGNIFICANT CHANGE UP (ref 34.5–45)
HGB BLD-MCNC: 11.6 G/DL — SIGNIFICANT CHANGE UP (ref 11.5–15.5)
MCHC RBC-ENTMCNC: 33 PG — SIGNIFICANT CHANGE UP (ref 27–34)
MCHC RBC-ENTMCNC: 33.1 GM/DL — SIGNIFICANT CHANGE UP (ref 32–36)
MCV RBC AUTO: 99.4 FL — SIGNIFICANT CHANGE UP (ref 80–100)
PLATELET # BLD AUTO: 243 K/UL — SIGNIFICANT CHANGE UP (ref 150–400)
RBC # BLD: 3.52 M/UL — LOW (ref 3.8–5.2)
RBC # FLD: 16.9 % — HIGH (ref 10.3–14.5)
SURGICAL PATHOLOGY STUDY: SIGNIFICANT CHANGE UP
WBC # BLD: 4.41 K/UL — SIGNIFICANT CHANGE UP (ref 3.8–10.5)
WBC # FLD AUTO: 4.41 K/UL — SIGNIFICANT CHANGE UP (ref 3.8–10.5)

## 2020-09-15 PROCEDURE — 96374 THER/PROPH/DIAG INJ IV PUSH: CPT

## 2020-09-15 PROCEDURE — 85610 PROTHROMBIN TIME: CPT

## 2020-09-15 PROCEDURE — 80053 COMPREHEN METABOLIC PANEL: CPT

## 2020-09-15 PROCEDURE — 36415 COLL VENOUS BLD VENIPUNCTURE: CPT

## 2020-09-15 PROCEDURE — P9059: CPT

## 2020-09-15 PROCEDURE — 82962 GLUCOSE BLOOD TEST: CPT

## 2020-09-15 PROCEDURE — 82248 BILIRUBIN DIRECT: CPT

## 2020-09-15 PROCEDURE — 99285 EMERGENCY DEPT VISIT HI MDM: CPT | Mod: 25

## 2020-09-15 PROCEDURE — 80076 HEPATIC FUNCTION PANEL: CPT

## 2020-09-15 PROCEDURE — 88173 CYTOPATH EVAL FNA REPORT: CPT

## 2020-09-15 PROCEDURE — 83690 ASSAY OF LIPASE: CPT

## 2020-09-15 PROCEDURE — 74330 X-RAY BILE/PANC ENDOSCOPY: CPT

## 2020-09-15 PROCEDURE — 88305 TISSUE EXAM BY PATHOLOGIST: CPT

## 2020-09-15 PROCEDURE — 86850 RBC ANTIBODY SCREEN: CPT

## 2020-09-15 PROCEDURE — 85027 COMPLETE CBC AUTOMATED: CPT

## 2020-09-15 PROCEDURE — C1769: CPT

## 2020-09-15 PROCEDURE — 87086 URINE CULTURE/COLONY COUNT: CPT

## 2020-09-15 PROCEDURE — 86901 BLOOD TYPING SEROLOGIC RH(D): CPT

## 2020-09-15 PROCEDURE — 86900 BLOOD TYPING SEROLOGIC ABO: CPT

## 2020-09-15 PROCEDURE — 83735 ASSAY OF MAGNESIUM: CPT

## 2020-09-15 PROCEDURE — 36430 TRANSFUSION BLD/BLD COMPNT: CPT

## 2020-09-15 PROCEDURE — 86769 SARS-COV-2 COVID-19 ANTIBODY: CPT

## 2020-09-15 PROCEDURE — 71045 X-RAY EXAM CHEST 1 VIEW: CPT

## 2020-09-15 PROCEDURE — 85025 COMPLETE CBC W/AUTO DIFF WBC: CPT

## 2020-09-15 PROCEDURE — 80048 BASIC METABOLIC PNL TOTAL CA: CPT

## 2020-09-15 PROCEDURE — 86304 IMMUNOASSAY TUMOR CA 125: CPT

## 2020-09-15 PROCEDURE — C1876: CPT

## 2020-09-15 PROCEDURE — 81001 URINALYSIS AUTO W/SCOPE: CPT

## 2020-09-15 PROCEDURE — 97116 GAIT TRAINING THERAPY: CPT

## 2020-09-15 PROCEDURE — U0003: CPT

## 2020-09-15 PROCEDURE — 97110 THERAPEUTIC EXERCISES: CPT

## 2020-09-15 PROCEDURE — 99239 HOSP IP/OBS DSCHRG MGMT >30: CPT

## 2020-09-15 PROCEDURE — 82378 CARCINOEMBRYONIC ANTIGEN: CPT

## 2020-09-15 PROCEDURE — 86301 IMMUNOASSAY TUMOR CA 19-9: CPT

## 2020-09-15 PROCEDURE — 97163 PT EVAL HIGH COMPLEX 45 MIN: CPT

## 2020-09-15 PROCEDURE — 88300 SURGICAL PATH GROSS: CPT

## 2020-09-15 PROCEDURE — 85730 THROMBOPLASTIN TIME PARTIAL: CPT

## 2020-09-15 PROCEDURE — 93005 ELECTROCARDIOGRAM TRACING: CPT

## 2020-09-15 PROCEDURE — 99233 SBSQ HOSP IP/OBS HIGH 50: CPT

## 2020-09-15 RX ORDER — ATENOLOL 25 MG/1
1 TABLET ORAL
Qty: 60 | Refills: 0
Start: 2020-09-15 | End: 2020-10-14

## 2020-09-15 RX ORDER — SPIRONOLACTONE 25 MG/1
1 TABLET, FILM COATED ORAL
Qty: 60 | Refills: 0
Start: 2020-09-15 | End: 2020-10-14

## 2020-09-15 RX ORDER — TRAVOPROST 0.04 MG/ML
1 SOLUTION/ DROPS OPHTHALMIC
Qty: 1 | Refills: 0
Start: 2020-09-15 | End: 2020-10-14

## 2020-09-15 RX ORDER — ATENOLOL 25 MG/1
1 TABLET ORAL
Qty: 0 | Refills: 0 | DISCHARGE

## 2020-09-15 RX ORDER — POTASSIUM CHLORIDE 20 MEQ
1 PACKET (EA) ORAL
Qty: 10 | Refills: 0
Start: 2020-09-15 | End: 2020-09-24

## 2020-09-15 RX ORDER — SPIRONOLACTONE 25 MG/1
1 TABLET, FILM COATED ORAL
Qty: 0 | Refills: 0 | DISCHARGE

## 2020-09-15 RX ORDER — TRAVOPROST 0.04 MG/ML
1 SOLUTION/ DROPS OPHTHALMIC
Qty: 0 | Refills: 0 | DISCHARGE

## 2020-09-15 RX ORDER — RIVAROXABAN 15 MG-20MG
1 KIT ORAL
Qty: 30 | Refills: 0
Start: 2020-09-15 | End: 2020-10-14

## 2020-09-15 RX ORDER — RIVAROXABAN 15 MG-20MG
1 KIT ORAL
Qty: 0 | Refills: 0 | DISCHARGE

## 2020-09-15 RX ADMIN — PIPERACILLIN AND TAZOBACTAM 25 GRAM(S): 4; .5 INJECTION, POWDER, LYOPHILIZED, FOR SOLUTION INTRAVENOUS at 06:35

## 2020-09-15 RX ADMIN — SPIRONOLACTONE 50 MILLIGRAM(S): 25 TABLET, FILM COATED ORAL at 06:35

## 2020-09-15 RX ADMIN — Medication 250 MILLIGRAM(S): at 06:35

## 2020-09-15 RX ADMIN — Medication 1: at 08:15

## 2020-09-15 RX ADMIN — ATENOLOL 50 MILLIGRAM(S): 25 TABLET ORAL at 06:35

## 2020-09-15 RX ADMIN — Medication 20 MILLIGRAM(S): at 06:35

## 2020-09-15 RX ADMIN — Medication 20 MILLIGRAM(S): at 11:54

## 2020-09-15 NOTE — PROGRESS NOTE ADULT - PROBLEM SELECTOR PLAN 1
due to pancreatic head mass and now s/p CBD stent replacement. Ok to restart xeralto.
due to pancreatic head mass, now stented. Suggest advance diet as tolerated. F/U LFTs
Resolving, s/p CBD stent placement, will discontinue antibiotics on discharge.
Resolving, s/p CBD stent placement, will discontinue antibiotics on discharge.
s/p EUS, continue Zosyn, trend labs, GI follow up appreciated.
s/p EUS, continue Zosyn, trend labs, GI follow up appreciated. Diet advanced

## 2020-09-15 NOTE — PROGRESS NOTE ADULT - ASSESSMENT
89 yr female with chronic atrial fibrillation on Xarelto, CHF, hypertension, glaucoma initially admitted to [place] for painless jaundice and weakness. Imaging done at the time revealed dilated pancreatic duct, CBD and possible stricture in proximal pancreatic duct by ampulla. She underwent ERCP with CBD stent placement at [place], post procedure developed pancreatitis. She was subsequently discharged to rehab but was readmitted to [place] with painless jaundice. She was transferred to [place] for EGD/EUS/ERCP. She underwent procedure on 9/11, EUS SHOWE18 mm x18 mm hypoechoic pancreatic head mass. ERCP showed ERCP: Occluded bile duct stent and removed with snare. Then 10 mm x 60 mm uncovered metal bile duct stent placed with drainage of large amount of thick bile.  
89 yr female with chronic atrial fibrillation on Xarelto, CHF, hypertension, glaucoma initially admitted to [place] for painless jaundice and weakness. Imaging done at the time revealed dilated pancreatic duct, CBD and possible stricture in proximal pancreatic duct by ampulla. She underwent ERCP with CBD stent placement at [place], post procedure developed pancreatitis. She was subsequently discharged to rehab but was readmitted to [place] with painless jaundice. She was transferred to [place] for EGD/EUS/ERCP. She underwent procedure on 9/11, EUS SHOWE18 mm x18 mm hypoechoic pancreatic head mass. ERCP showed ERCP: Occluded bile duct stent and removed with snare. Then 10 mm x 60 mm uncovered metal bile duct stent placed with drainage of large amount of thick bile. Jaundice improved. She was assessed by PT and is stable for discharge home.    Patient will be given a wheelchair on discharge. The beneficiary has a mobility limitation that significantly impairs her ability to participate in one or more mobility related activities of daily living such as toileting, feeding, dressing, grooming and bathing in customary locations in the home.  The beneficiary's mobility limitation cannot be sufficiently resolved by the use of an appropriately fitted cane or walker.  The beneficiary's home provides adequate access for use of manual wheelchair.  Use of a manual wheelchair will significantly improve her ability to participate in MRADLS.  The beneficiary has not expressed an unwillingness to use wheelchair.  The beneficiary has sufficient upper extremity function and other physical and mental capabilities needed to safely self propel the manual wheelchair.    The patient needs seat and back cushion for positioning/posture and to prevent breakdowns.
89 yr female with chronic atrial fibrillation on Xarelto, CHF, hypertension, glaucoma initially admitted to [place] for painless jaundice and weakness. Imaging done at the time revealed dilated pancreatic duct, CBD and possible stricture in proximal pancreatic duct by ampulla. She underwent ERCP with CBD stent placement at [place], post procedure developed pancreatitis. She was subsequently discharged to rehab but was readmitted to [place] with painless jaundice. She was transferred to [place] for EGD/EUS/ERCP. She underwent procedure on 9/11, EUS SHOWE18 mm x18 mm hypoechoic pancreatic head mass. ERCP showed ERCP: Occluded bile duct stent and removed with snare. Then 10 mm x 60 mm uncovered metal bile duct stent placed with drainage of large amount of thick bile.  
89 yr female with chronic atrial fibrillation on Xarelto, CHF, hypertension, glaucoma initially admitted to [place] for painless jaundice and weakness. Imaging done at the time revealed dilated pancreatic duct, CBD and possible stricture in proximal pancreatic duct by ampulla. She underwent ERCP with CBD stent placement at [place], post procedure developed pancreatitis. She was subsequently discharged to rehab but was readmitted to [place] with painless jaundice. She was transferred to [place] for EGD/EUS/ERCP. She underwent procedure on 9/11, EUS SHOWE18 mm x18 mm hypoechoic pancreatic head mass. ERCP showed ERCP: Occluded bile duct stent and removed with snare. Then 10 mm x 60 mm uncovered metal bile duct stent placed with drainage of large amount of thick bile. Jaundice improved.
89y/oF PMH chronic afib on xarelto, HFpEF, HTN, glaucoma transferred from [facility] for EUS, FNA, ERCP.  Pt recently admitted to [place] (8/5-8/14) with jaundice, c/o weakness during which time, pt had CT and MRI with dilated pancreatic duct, CBD dilatation, poss stricture in proximal pancreatic duct near ampulla. 8/6 had ERCP by Dr. Roth with ? stricture of CBD just proximal to ampulla and tortuous pancreatic duct. 5cm x 8.5F CBD stent placed. Imaging showed small pancreatic cysts but no lesions. Cytology: +atypical cells. Course complicated by pancreatitis post-ERCP, which resolved. Pt was then d/c'ed to rehab. Readmitted to [place] 9/5 with worsening jaundice.     Biliary obstruction  painless jaundice  elevated LFTs  -s/p recent CBD stent (8/6/20)  -abd sono: hyropic gallbladder with cholecystolithias, worsening dilatation of CBD with intrahepatic biliary ductal dilatation and stable dilatation of pancreatic duct   -MRCP 9/7: dilatation of CBD and pancreatic duct, which abruptly taper at level of ampulla; suspicious for ampullary lesion or lesion at head of pancreas. (main duct IPMN also in differential). hydropic gallbladder, no pericholecystic edema.   -plan for EUS, ERCP, r/o small pancreatic neoplasm or cholangiocarcinoma 9/11  -cardio recs appreciated     chronic afib, HFpEF chronic diastolic heart failure, HTN  -holding home xarelto for procedure  -cont heparin gtt  -cont atenolol 50mg BID with holding parameters     E.coli UTI  -positive urine cx at [place]   -s/p 4 days Zosyn at [place], will cont zosyn here    Diabetes type 2  -HgbA1c 6.4  -not on home meds  -cont ISS    Glaucoma   -cont travoprost gtt    PT eval ordered
89y/oF PMH chronic afib on xarelto, HFpEF, HTN, glaucoma transferred from [facility] for EUS, FNA, ERCP.  Pt recently admitted to [place] (8/5-8/14) with jaundice, c/o weakness during which time, pt had CT and MRI with dilated pancreatic duct, CBD dilatation, poss stricture in proximal pancreatic duct near ampulla. 8/6 had ERCP by Dr. Roth with ? stricture of CBD just proximal to ampulla and tortuous pancreatic duct. 5cm x 8.5F CBD stent placed. Imaging showed small pancreatic cysts but no lesions. Cytology: +atypical cells. Course complicated by pancreatitis post-ERCP, which resolved. Pt was then d/c'ed to rehab. Readmitted to [place] 9/5 with worsening jaundice.     Biliary obstruction  painless jaundice  elevated LFTs  -s/p recent CBD stent (8/6/20)  -abd sono: hyropic gallbladder with cholecystolithias, worsening dilatation of CBD with intrahepatic biliary ductal dilatation and stable dilatation of pancreatic duct   -MRCP 9/7: dilatation of CBD and pancreatic duct, which abruptly taper at level of ampulla; suspicious for ampullary lesion or lesion at head of pancreas. (main duct IPMN also in differential). hydropic gallbladder, no pericholecystic edema.   -plan for EUS, ERCP, r/o small pancreatic neoplasm or cholangiocarcinoma 9/11  -NPO after midnight tonight 9/10  -cardio recs appreciated     chronic afib, HFpEF chronic diastolic heart failure, HTN  -holding home xarelto for procedure  -cont heparin gtt  -cont atenolol 50mg BID with holding parameters     E.coli UTI  -positive urine cx at [place]   -s/p 4 days Zosyn at [place], will cont zosyn here    Diabetes type 2  -HgbA1c 6.4  -not on home meds  -cont ISS    Glaucoma   -cont travoprost gtt    PT eval ordered
As above, discussed the FNA results with the patient and her daughter.  Advised follow-up with Dr. Roth with whom I also spoke.  Patient safe for discharge from GI POV.
Obstructive jaundice: Doing better with metal stent placement. Likely etiology is pancreatic head mass leading to CBD obstruction. Follow cytology results. FU with Dr Tawanda Roth-outpatient GI in [place].     
Obstructive jaundice: Scheduled for EGD/EUS/ERCP. Check INR today. Will need to hold IV heparin for at least 6 hours before EUS/ERCP. NPO after midnight.

## 2020-09-15 NOTE — PROGRESS NOTE ADULT - PROBLEM SELECTOR PLAN 3
FNA pending but ca19-9 is high
S/p FNA
S/p FNA
S/p FNA. Will need GI follow up on discharge.
S/p FNA. Will need GI follow up on discharge.

## 2020-09-15 NOTE — PROGRESS NOTE ADULT - PROBLEM SELECTOR PROBLEM 2
Biliary obstruction
Pancreatic mass
Atrial fibrillation with RVR

## 2020-09-15 NOTE — PROGRESS NOTE ADULT - SUBJECTIVE AND OBJECTIVE BOX
Chief Complaint: This is a 89y old woman patient being seen in follow-up consultation for obstructive jaundice.    HPI / 24H events:  Informed patient (and her daughter via telephone) of positive cytology.  Patient has no nausea or abdominal pain.  Wanted to understand the next steps.    ROS: A 14-point review of systems was reviewed and was otherwise negative save what was reported in the HPI.    PAST MEDICAL/SURGICAL HISTORY:  Glaucoma  Hypertension  Afib  S/P hysterectomy    MEDICATIONS  (STANDING):  ATENolol  Tablet 50 milliGRAM(s) Oral two times a day  dextrose 50% Injectable 12.5 Gram(s) IV Push once  dextrose 50% Injectable 25 Gram(s) IV Push once  dextrose 50% Injectable 25 Gram(s) IV Push once  dicyclomine 20 milliGRAM(s) Oral four times a day before meals  insulin lispro (HumaLOG) corrective regimen sliding scale   SubCutaneous three times a day before meals  insulin lispro (HumaLOG) corrective regimen sliding scale   SubCutaneous at bedtime  latanoprost 0.005% Ophthalmic Solution 1 Drop(s) Both EYES at bedtime  melatonin 5 milliGRAM(s) Oral at bedtime  potassium chloride    Tablet ER 40 milliEquivalent(s) Oral once  rivaroxaban 20 milliGRAM(s) Oral with dinner  saccharomyces boulardii 250 milliGRAM(s) Oral two times a day  spironolactone Oral Tab/Cap - Peds 50 milliGRAM(s) Oral two times a day    MEDICATIONS  (PRN):  bisacodyl 5 milliGRAM(s) Oral every 12 hours PRN Constipation  dextrose 40% Gel 15 Gram(s) Oral once PRN Blood Glucose LESS THAN 70 milliGRAM(s)/deciliter  glucagon  Injectable 1 milliGRAM(s) IntraMuscular once PRN Glucose LESS THAN 70 milligrams/deciliter    clindamycin (Rash)  penicillin (Rash)  sulfa drugs (Unknown)    T(C): 37 (09-15-20 @ 12:39), Max: 37 (09-15-20 @ 12:39)  HR: 60 (09-15-20 @ 12:39) (60 - 81)  BP: 137/73 (09-15-20 @ 12:39) (110/60 - 137/73)  RR: 18 (09-15-20 @ 12:39) (18 - 18)  SpO2: 99% (09-15-20 @ 12:39) (98% - 99%)    I&O's Summary    14 Sep 2020 07:01  -  15 Sep 2020 07:00  --------------------------------------------------------  IN: 0 mL / OUT: 2300 mL / NET: -2300 mL      PHYSICAL EXAM:  Constitutional: Well-developed, well-nourished, in no apparent distress  Eyes: Sclerae icteric, conjunctivae normal  ENMT: Mucus membranes moist, no oropharyngeal thrush noted  Respiratory: Breathing nonlabored; clear to auscultation  Gastrointestinal: Soft, nontender, nondistended, normoactive bowel sounds; no hepatosplenomegaly appreciated; no rebound tenderness or involuntary guarding  Extremities: No clubbing, cyanosis or edema  Neurological: Alert and oriented to person, place and time; no asterixis  Skin: Jaundice  Lymph Nodes: No significant lymphadenopathy  Musculoskeletal: No significant peripheral atrophy  Psychiatric: Affect and mood appropriate                   11.6   4.41  )-----------( 243      ( 09-15 @ 07:14 )             35.0                11.7   5.67  )-----------( 245      ( 09-14 @ 05:49 )             34.7                12.0   10.28 )-----------( 246      ( 09-13 @ 03:02 )             34.8                12.0   12.71 )-----------( 260      ( 09-12 @ 19:40 )             34.0       09-14    136  |  103  |  20.0  ----------------------------<  129<H>  3.6   |  23.0  |  0.52    Ca    8.2<L>      14 Sep 2020 05:49    TPro  6.0<L>  /  Alb  2.6<L>  /  TBili  5.7<H>  /  DBili  3.8<H>  /  AST  52<H>  /  ALT  61<H>  /  AlkPhos  258<H>  09-14    LIVER FUNCTIONS - ( 14 Sep 2020 05:49 )  Alb: 2.6 g/dL / Pro: 6.0 g/dL / ALK PHOS: 258 U/L / ALT: 61 U/L / AST: 52 U/L / GGT: x

## 2020-09-15 NOTE — PROGRESS NOTE ADULT - PROBLEM SELECTOR PROBLEM 1
Jaundice
Obstructive jaundice

## 2020-09-15 NOTE — PROGRESS NOTE ADULT - REASON FOR ADMISSION
Obstructive jaundice

## 2020-09-15 NOTE — PROGRESS NOTE ADULT - PROBLEM SELECTOR PLAN 2
as above
awaiting FNA but would anticipate D/C tomorrow.
Continue Xarelto, continue Atenolol.
Continue Xarelto, continue Atenolol.
Continue heparin gtt, continue Atenolol.
Switch to Xarelto, continue Atenolol.

## 2020-09-15 NOTE — PROGRESS NOTE ADULT - SUBJECTIVE AND OBJECTIVE BOX
INTERVAL HPI/OVERNIGHT EVENTS:    CC: obstructive jaundice, pancreatic head mass, a fib      No overnight events, denies complaints.    Vital Signs Last 24 Hrs  T(C): 36.6 (15 Sep 2020 06:35), Max: 36.8 (14 Sep 2020 17:23)  T(F): 97.9 (15 Sep 2020 06:35), Max: 98.2 (14 Sep 2020 17:23)  HR: 73 (15 Sep 2020 06:35) (73 - 81)  BP: 134/84 (15 Sep 2020 06:35) (110/60 - 134/84)  BP(mean): --  RR: 18 (15 Sep 2020 06:35) (18 - 18)  SpO2: 98% (15 Sep 2020 06:35) (98% - 99%)    PHYSICAL EXAM:    GENERAL: alert, not in distress  CHEST/LUNG: b/l air entry  HEART: regular  ABDOMEN: soft, non tender  EXTREMITIES:  no edema, tenderness    MEDICATIONS  (STANDING):  ATENolol  Tablet 50 milliGRAM(s) Oral two times a day  dextrose 50% Injectable 12.5 Gram(s) IV Push once  dextrose 50% Injectable 25 Gram(s) IV Push once  dextrose 50% Injectable 25 Gram(s) IV Push once  dicyclomine 20 milliGRAM(s) Oral four times a day before meals  insulin lispro (HumaLOG) corrective regimen sliding scale   SubCutaneous three times a day before meals  insulin lispro (HumaLOG) corrective regimen sliding scale   SubCutaneous at bedtime  latanoprost 0.005% Ophthalmic Solution 1 Drop(s) Both EYES at bedtime  melatonin 5 milliGRAM(s) Oral at bedtime  piperacillin/tazobactam IVPB.. 3.375 Gram(s) IV Intermittent every 8 hours  potassium chloride    Tablet ER 40 milliEquivalent(s) Oral once  rivaroxaban 20 milliGRAM(s) Oral with dinner  saccharomyces boulardii 250 milliGRAM(s) Oral two times a day  spironolactone Oral Tab/Cap - Peds 50 milliGRAM(s) Oral two times a day    MEDICATIONS  (PRN):  bisacodyl 5 milliGRAM(s) Oral every 12 hours PRN Constipation  dextrose 40% Gel 15 Gram(s) Oral once PRN Blood Glucose LESS THAN 70 milliGRAM(s)/deciliter  glucagon  Injectable 1 milliGRAM(s) IntraMuscular once PRN Glucose LESS THAN 70 milligrams/deciliter      Allergies    clindamycin (Rash)  penicillin (Rash)  sulfa drugs (Unknown)    Intolerances          LABS:                          11.6   4.41  )-----------( 243      ( 15 Sep 2020 07:14 )             35.0     09-14    136  |  103  |  20.0  ----------------------------<  129<H>  3.6   |  23.0  |  0.52    Ca    8.2<L>      14 Sep 2020 05:49    TPro  6.0<L>  /  Alb  2.6<L>  /  TBili  5.7<H>  /  DBili  3.8<H>  /  AST  52<H>  /  ALT  61<H>  /  AlkPhos  258<H>  09-14    PTT - ( 13 Sep 2020 11:30 )  PTT:66.6 sec      RADIOLOGY & ADDITIONAL TESTS:

## 2020-11-20 PROBLEM — Z00.00 ENCOUNTER FOR PREVENTIVE HEALTH EXAMINATION: Status: ACTIVE | Noted: 2020-11-20

## 2021-01-28 PROBLEM — H40.9 UNSPECIFIED GLAUCOMA: Chronic | Status: ACTIVE | Noted: 2020-09-09

## 2021-01-28 PROBLEM — I48.91 UNSPECIFIED ATRIAL FIBRILLATION: Chronic | Status: ACTIVE | Noted: 2020-09-09

## 2021-01-28 PROBLEM — I10 ESSENTIAL (PRIMARY) HYPERTENSION: Chronic | Status: ACTIVE | Noted: 2020-09-09

## 2021-02-10 ENCOUNTER — APPOINTMENT (OUTPATIENT)
Age: 86
End: 2021-02-10

## 2021-02-22 ENCOUNTER — RESULT CHARGE (OUTPATIENT)
Age: 86
End: 2021-02-22

## 2021-02-22 ENCOUNTER — APPOINTMENT (OUTPATIENT)
Dept: UROGYNECOLOGY | Facility: CLINIC | Age: 86
End: 2021-02-22
Payer: MEDICARE

## 2021-02-22 DIAGNOSIS — Z87.19 PERSONAL HISTORY OF OTHER DISEASES OF THE DIGESTIVE SYSTEM: ICD-10-CM

## 2021-02-22 DIAGNOSIS — Z82.49 FAMILY HISTORY OF ISCHEMIC HEART DISEASE AND OTHER DISEASES OF THE CIRCULATORY SYSTEM: ICD-10-CM

## 2021-02-22 DIAGNOSIS — Z87.01 PERSONAL HISTORY OF PNEUMONIA (RECURRENT): ICD-10-CM

## 2021-02-22 DIAGNOSIS — Z86.79 PERSONAL HISTORY OF OTHER DISEASES OF THE CIRCULATORY SYSTEM: ICD-10-CM

## 2021-02-22 DIAGNOSIS — Z83.49 FAMILY HISTORY OF OTHER ENDOCRINE, NUTRITIONAL AND METABOLIC DISEASES: ICD-10-CM

## 2021-02-22 DIAGNOSIS — Z86.69 PERSONAL HISTORY OF OTHER DISEASES OF THE NERVOUS SYSTEM AND SENSE ORGANS: ICD-10-CM

## 2021-02-22 DIAGNOSIS — Z86.39 PERSONAL HISTORY OF OTHER ENDOCRINE, NUTRITIONAL AND METABOLIC DISEASE: ICD-10-CM

## 2021-02-22 DIAGNOSIS — E07.9 DISORDER OF THYROID, UNSPECIFIED: ICD-10-CM

## 2021-02-22 DIAGNOSIS — Z83.511 FAMILY HISTORY OF GLAUCOMA: ICD-10-CM

## 2021-02-22 DIAGNOSIS — N39.41 URGE INCONTINENCE: ICD-10-CM

## 2021-02-22 DIAGNOSIS — Z63.4 DISAPPEARANCE AND DEATH OF FAMILY MEMBER: ICD-10-CM

## 2021-02-22 DIAGNOSIS — Z60.2 PROBLEMS RELATED TO LIVING ALONE: ICD-10-CM

## 2021-02-22 DIAGNOSIS — R35.1 NOCTURIA: ICD-10-CM

## 2021-02-22 DIAGNOSIS — Z85.07 PERSONAL HISTORY OF MALIGNANT NEOPLASM OF PANCREAS: ICD-10-CM

## 2021-02-22 LAB
BILIRUB UR QL STRIP: NEGATIVE
CLARITY UR: CLEAR
COLLECTION METHOD: NORMAL
GLUCOSE UR-MCNC: NEGATIVE
HCG UR QL: 0.2 EU/DL
HGB UR QL STRIP.AUTO: NORMAL
KETONES UR-MCNC: NEGATIVE
LEUKOCYTE ESTERASE UR QL STRIP: NORMAL
NITRITE UR QL STRIP: NEGATIVE
PH UR STRIP: 6
PROT UR STRIP-MCNC: NEGATIVE
SP GR UR STRIP: 1.01

## 2021-02-22 PROCEDURE — 51701 INSERT BLADDER CATHETER: CPT

## 2021-02-22 PROCEDURE — 99214 OFFICE O/P EST MOD 30 MIN: CPT | Mod: 25

## 2021-02-22 RX ORDER — SPIRONOLACTONE 25 MG/1
25 TABLET ORAL DAILY
Refills: 0 | Status: ACTIVE | COMMUNITY
Start: 2021-02-22

## 2021-02-22 SDOH — SOCIAL STABILITY - SOCIAL INSECURITY: DISSAPEARANCE AND DEATH OF FAMILY MEMBER: Z63.4

## 2021-02-22 SDOH — SOCIAL STABILITY - SOCIAL INSECURITY: PROBLEMS RELATED TO LIVING ALONE: Z60.2

## 2021-02-22 NOTE — OB HISTORY
[Vaginal ___] : [unfilled] vaginal delivery(s) [Approximately ___ (Month)] : the LMP was approximately [unfilled] month(s) ago [Last Pap Smear ___] : date of last pap smear was on [unfilled] [Sexually Active] : is not sexually active

## 2021-02-22 NOTE — PHYSICAL EXAM
[Chaperone Present] : A chaperone was present in the examining room during all aspects of the physical examination [No Acute Distress] : in no acute distress [Well developed] : well developed [Well Nourished] : ~L well nourished [Oriented x3] : oriented to person, place, and time [Normal Memory] : ~T memory was ~L unimpaired [Cough] : no cough [No Edema] : ~T edema was present [Tenderness] : ~T no ~M abdominal tenderness observed [Distended] : not distended [Inguinal LAD] : no adenopathy was noted in the inguinal lymph nodes [Warm and Dry] : was warm and dry to touch [Vulvar Atrophy] : vulvar atrophy [Labia Majora] : were normal [Labia Minora] : were normal [Normal Appearance] : general appearance was normal [Atrophy] : atrophy [No Bleeding] : there was no active vaginal bleeding [3] : 3 [Absent] : absent [Normal] : no abnormalities [Post Void Residual ____ml] : post void residual was [unfilled] ml [FreeTextEntry3] : neg CST [de-identified] : no prolapse, short vagina

## 2021-02-22 NOTE — HISTORY OF PRESENT ILLNESS
[Cystocele (Obstetric)] : no [Vaginal Wall Prolapse] : no [Rectal Prolapse] : no [Urge Incontinence Of Urine] : no [Unable To Restrain Bowel Movement] : severe [x3+] : nocturia three or more  times a night [Urinary Frequency] : no [Feelings Of Urinary Urgency] : no [Pain During Urination (Dysuria)] : no [Urinary Tract Infection] : moderate [Uses ___ pads per day] : uses [unfilled] pad(s) per day [Constipation Obstructed Defecation] : no [Incomplete Emptying Of Stool] : no [] : no [Vaginal Pain] : no [FreeTextEntry1] : \mark anthony Bonilla presents for UTI X 2 months, she reports being hospitalized and found to have pancreatic cancer and recently had a stent, she reports frequency, urgency incontinence, denies dysuria, no itching, nocturia X 2, she has long term history of frequency/urgency, she is wearing pads about 5/day, it is about a 2-3 hour period between bathroom voids, she reports during the night she is incontinent as well, she denies hematuria, denies incomplete emptying, reports intermittent stream, she has been treated 4 times for this UTI, denies pelvic pain, denies vaginal bulge, denies vaginal bleeding, denies leakage of stool, not sexually active, she just does not have control, unsure what abx she was given \par \par hysterectomy in 2003 by Dr. Bonilla for abnormal bleeding \par h/o glaucoma- unsure which type\par pancreatic cancer- had a biopsy recently, unclear what stage, recently discovered, \par CHF/A-fib, obesity

## 2021-02-22 NOTE — DISCUSSION/SUMMARY
[FreeTextEntry1] : \par Irene presents with her son for evaluation, she has recurrent UTI vs OAB, I do think she may have asymptomatic bacteria in urine vs true recurrent UTIs. On exam atrophy, normal PVR, short vagina. She does have pancreatic cancer and is currently under conservative management, unclear what stage. She does have extensive LE edema as well and is on diuretics.\par \par 1. Recurrent UTI: She does not have culture results available. We discussed etiology and management of recurrent urinary tract infections vs asymptomatic bacteria in the urine. We discussed behavioral modifications including increased oral intake, cranberry tablets, wiping front to back, d mannose. We discussed management of recurrent UTIs with vaginal estrogen and antibiotic prophylaxis. She is not interested in vaginal estrogen and discussed nonhormonal vaginal lubricants.   We discussed that is she is symptomatic I prefer for catheterized specimen however if she cannot come to the office, discussed u/a and culture prior to antibiotic treatment.\par \par 2. OAB: We reviewed her symptoms and exam findings. We discussed management options for overactive bladder including observation, behavorial modifications and bladder training, physical therapy and medications including anticholinergics and beta 3 agonists. We discussed if behavorial modifications and medications fail proceeding with urodynamics to further evaluate her symptoms. We discussed additional treatment options including sacral neuromodulation, PTNS and intra detrusor Botox. IUGA handout on overactive bladder and bladder training was given to her. She will start with timed voiding and fluid modifications.\par \par IUGA BT and OAB given to her.\par \par []u/a, culture\par [] BT/timed voiding\par [] replens/luvena \par

## 2021-02-23 LAB
APPEARANCE: CLEAR
BACTERIA: ABNORMAL
BILIRUBIN URINE: NEGATIVE
BLOOD URINE: ABNORMAL
COLOR: NORMAL
GLUCOSE QUALITATIVE U: NEGATIVE
HYALINE CASTS: 0 /LPF
KETONES URINE: NEGATIVE
LEUKOCYTE ESTERASE URINE: ABNORMAL
MICROSCOPIC-UA: NORMAL
NITRITE URINE: NEGATIVE
PH URINE: 6.5
PROTEIN URINE: NEGATIVE
RED BLOOD CELLS URINE: 1 /HPF
SPECIFIC GRAVITY URINE: 1.01
SQUAMOUS EPITHELIAL CELLS: 0 /HPF
UROBILINOGEN URINE: NORMAL
WHITE BLOOD CELLS URINE: 29 /HPF

## 2021-03-02 ENCOUNTER — APPOINTMENT (OUTPATIENT)
Age: 86
End: 2021-03-02

## 2021-04-09 ENCOUNTER — APPOINTMENT (OUTPATIENT)
Dept: UROGYNECOLOGY | Facility: CLINIC | Age: 86
End: 2021-04-09
Payer: MEDICARE

## 2021-04-09 DIAGNOSIS — N39.0 URINARY TRACT INFECTION, SITE NOT SPECIFIED: ICD-10-CM

## 2021-04-09 DIAGNOSIS — R39.15 URGENCY OF URINATION: ICD-10-CM

## 2021-04-09 DIAGNOSIS — R35.0 FREQUENCY OF MICTURITION: ICD-10-CM

## 2021-04-09 PROCEDURE — 99213 OFFICE O/P EST LOW 20 MIN: CPT

## 2021-04-09 RX ORDER — NITROFURANTOIN MACROCRYSTALS 100 MG/1
100 CAPSULE ORAL
Qty: 10 | Refills: 0 | Status: DISCONTINUED | COMMUNITY
Start: 2021-02-26 | End: 2021-04-09

## 2021-04-09 RX ORDER — METHENAMINE HIPPURATE 1 G/1
1 TABLET ORAL
Qty: 90 | Refills: 1 | Status: ACTIVE | COMMUNITY
Start: 2021-04-09 | End: 1900-01-01

## 2021-04-09 NOTE — HISTORY OF PRESENT ILLNESS
[FreeTextEntry1] : Pt presents to office for f/u on recurrent UTI.  She was treated with macrobid on 2/22/21 culture showed >100K ecoli and 50-99K enterobacter cloacae complex.  She is unsure if infection is gone as she was not having symptoms at that time.  Today she remains asymptomatic.  She has been practicing timed voids and behavioral modifications for OAB and she does note improvement in frequency and she is now "sleeping through the night".  She denies any dysuria, incomplete emptying, urgency or frequency.  She does have some + UUI in the morning but this is unchanged from previous visit.  We discussed vaginal estrogen again but she declines.  Will start methenamine daily x 6 months.  Pt advised to RTO 6 months or sooner if she becomes symptomatic for UTI and symptoms were reviewed with her.  We again discussed asymptomatic bacteria in the urine and she verbalizes understanding.  Instructed to call with any questions or concerns.

## 2021-05-17 RX ORDER — FOSFOMYCIN TROMETHAMINE 3 G/1
3 POWDER ORAL
Qty: 3 | Refills: 2 | Status: ACTIVE | COMMUNITY
Start: 2021-05-17 | End: 1900-01-01

## 2021-05-30 NOTE — PATIENT PROFILE ADULT - VISION (WITH CORRECTIVE LENSES IF THE PATIENT USUALLY WEARS THEM):
decreased ability to use arms for pushing/pulling/decreased ability to use legs for bridging/pushing Partially impaired: cannot see medication labels or newsprint, but can see obstacles in path, and the surrounding layout; can count fingers at arm's length

## 2022-09-13 NOTE — PROVIDER CONTACT NOTE (CRITICAL VALUE NOTIFICATION) - NS PROVIDER READ BACK TO LAB
Roverto/dez/yes Nsaids Counseling: NSAID Counseling: I discussed with the patient that NSAIDs should be taken with food. Prolonged use of NSAIDs can result in the development of stomach ulcers.  Patient advised to stop taking NSAIDs if abdominal pain occurs.  The patient verbalized understanding of the proper use and possible adverse effects of NSAIDs.  All of the patient's questions and concerns were addressed.

## 2022-11-08 NOTE — ED PROVIDER NOTE - NS ED MD DISPO ISOLATION TYPES
Please call the pharmacy and ask them to send the request to Dr. Everardo Gonsales's office.     None

## 2024-05-23 NOTE — CONSULT NOTE ADULT - PROBLEM/RECOMMENDATION-3
Patient is asking if she can take flonase with Diovan.  Reason for Disposition   [1] Caller has medicine question about med NOT prescribed by PCP AND [2] triager unable to answer question (e.g., compatibility with other med, storage)    Additional Information   Negative: [1] Intentional drug overdose AND [2] suicidal thoughts or ideas   Negative: Drug overdose and triager unable to answer question   Negative: Caller requesting a renewal or refill of a medicine patient is currently taking   Negative: Caller requesting information unrelated to medicine   Negative: Caller requesting information about COVID-19 Vaccine   Negative: Caller requesting information about Emergency Contraception   Negative: Caller requesting information about Combined Birth Control Pills   Negative: Caller requesting information about Progestin Birth Control Pills   Negative: Caller requesting information about Post-Op pain or medicines   Negative: Caller requesting a prescription antibiotic (such as Penicillin) for Strep throat and has a positive culture result   Negative: Caller requesting a prescription anti-viral med (such as Tamiflu) and has influenza (flu) symptoms   Negative: Immunization reaction suspected   Negative: Rash while taking a medicine or within 3 days of stopping it   Negative: [1] Asthma and [2] having symptoms of asthma (cough, wheezing, etc.)   Negative: [1] Symptom of illness (e.g., headache, abdominal pain, earache, vomiting) AND [2] more than mild   Negative: Breastfeeding questions about mother's medicines and diet   Negative: MORE THAN A DOUBLE DOSE of a prescription or over-the-counter (OTC) drug   Negative: [1] DOUBLE DOSE (an extra dose or lesser amount) of prescription drug AND [2] any symptoms (e.g., dizziness, nausea, pain, sleepiness)   Negative: [1] DOUBLE DOSE (an extra dose or lesser amount) of over-the-counter (OTC) drug AND [2] any symptoms (e.g., dizziness, nausea, pain, sleepiness)   Negative: Took  another person's prescription drug    Protocols used: Medication Question Call-A-AH     DISPLAY PLAN FREE TEXT